# Patient Record
Sex: FEMALE | URBAN - METROPOLITAN AREA
[De-identification: names, ages, dates, MRNs, and addresses within clinical notes are randomized per-mention and may not be internally consistent; named-entity substitution may affect disease eponyms.]

---

## 2021-06-17 PROBLEM — F23 ACUTE PSYCHOSIS (HCC): Status: ACTIVE | Noted: 2021-06-17

## 2021-06-18 ENCOUNTER — HOSPITAL ENCOUNTER (INPATIENT)
Age: 76
LOS: 18 days | Discharge: HOME | DRG: 885 | End: 2021-07-06
Attending: PSYCHIATRY & NEUROLOGY | Admitting: PSYCHIATRY & NEUROLOGY
Payer: MEDICARE

## 2021-06-18 PROBLEM — E43 SEVERE MALNUTRITION (HCC): Chronic | Status: ACTIVE | Noted: 2021-06-18

## 2021-06-18 LAB
ABSOLUTE EOS #: 0.5 K/UL (ref 0–0.4)
ABSOLUTE IMMATURE GRANULOCYTE: ABNORMAL K/UL (ref 0–0.3)
ABSOLUTE LYMPH #: 2.1 K/UL (ref 1–4.8)
ABSOLUTE MONO #: 0.6 K/UL (ref 0.1–1.3)
ALBUMIN SERPL-MCNC: 4.1 G/DL (ref 3.5–5.2)
ALBUMIN/GLOBULIN RATIO: ABNORMAL (ref 1–2.5)
ALP BLD-CCNC: 74 U/L (ref 35–104)
ALT SERPL-CCNC: <5 U/L (ref 5–33)
ANION GAP SERPL CALCULATED.3IONS-SCNC: 12 MMOL/L (ref 9–17)
AST SERPL-CCNC: 14 U/L
BASOPHILS # BLD: 1 % (ref 0–2)
BASOPHILS ABSOLUTE: 0.1 K/UL (ref 0–0.2)
BILIRUB SERPL-MCNC: 0.38 MG/DL (ref 0.3–1.2)
BUN BLDV-MCNC: 13 MG/DL (ref 8–23)
BUN/CREAT BLD: ABNORMAL (ref 9–20)
CALCIUM SERPL-MCNC: 9.4 MG/DL (ref 8.6–10.4)
CHLORIDE BLD-SCNC: 109 MMOL/L (ref 98–107)
CO2: 22 MMOL/L (ref 20–31)
CREAT SERPL-MCNC: 0.94 MG/DL (ref 0.5–0.9)
DIFFERENTIAL TYPE: ABNORMAL
EOSINOPHILS RELATIVE PERCENT: 7 % (ref 0–4)
ESTIMATED AVERAGE GLUCOSE: 114 MG/DL
GFR AFRICAN AMERICAN: >60 ML/MIN
GFR NON-AFRICAN AMERICAN: 58 ML/MIN
GFR SERPL CREATININE-BSD FRML MDRD: ABNORMAL ML/MIN/{1.73_M2}
GFR SERPL CREATININE-BSD FRML MDRD: ABNORMAL ML/MIN/{1.73_M2}
GLUCOSE BLD-MCNC: 125 MG/DL (ref 65–105)
GLUCOSE BLD-MCNC: 94 MG/DL (ref 65–105)
GLUCOSE BLD-MCNC: 95 MG/DL (ref 70–99)
HBA1C MFR BLD: 5.6 % (ref 4–6)
HCT VFR BLD CALC: 36.8 % (ref 36–46)
HEMOGLOBIN: 12.4 G/DL (ref 12–16)
IMMATURE GRANULOCYTES: ABNORMAL %
LYMPHOCYTES # BLD: 33 % (ref 24–44)
MCH RBC QN AUTO: 32.2 PG (ref 26–34)
MCHC RBC AUTO-ENTMCNC: 33.7 G/DL (ref 31–37)
MCV RBC AUTO: 95.6 FL (ref 80–100)
MONOCYTES # BLD: 9 % (ref 1–7)
NRBC AUTOMATED: ABNORMAL PER 100 WBC
PDW BLD-RTO: 13.2 % (ref 11.5–14.9)
PLATELET # BLD: 255 K/UL (ref 150–450)
PLATELET ESTIMATE: ABNORMAL
PMV BLD AUTO: 8.3 FL (ref 6–12)
POTASSIUM SERPL-SCNC: 4.7 MMOL/L (ref 3.7–5.3)
RBC # BLD: 3.85 M/UL (ref 4–5.2)
RBC # BLD: ABNORMAL 10*6/UL
SEG NEUTROPHILS: 50 % (ref 36–66)
SEGMENTED NEUTROPHILS ABSOLUTE COUNT: 3.2 K/UL (ref 1.3–9.1)
SODIUM BLD-SCNC: 143 MMOL/L (ref 135–144)
TOTAL PROTEIN: 6.9 G/DL (ref 6.4–8.3)
TSH SERPL DL<=0.05 MIU/L-ACNC: 4.25 MIU/L (ref 0.3–5)
WBC # BLD: 6.5 K/UL (ref 3.5–11)
WBC # BLD: ABNORMAL 10*3/UL

## 2021-06-18 PROCEDURE — 6370000000 HC RX 637 (ALT 250 FOR IP): Performed by: PSYCHIATRY & NEUROLOGY

## 2021-06-18 PROCEDURE — 82947 ASSAY GLUCOSE BLOOD QUANT: CPT

## 2021-06-18 PROCEDURE — 36415 COLL VENOUS BLD VENIPUNCTURE: CPT

## 2021-06-18 PROCEDURE — 80053 COMPREHEN METABOLIC PANEL: CPT

## 2021-06-18 PROCEDURE — 99223 1ST HOSP IP/OBS HIGH 75: CPT | Performed by: PSYCHIATRY & NEUROLOGY

## 2021-06-18 PROCEDURE — 84443 ASSAY THYROID STIM HORMONE: CPT

## 2021-06-18 PROCEDURE — 99222 1ST HOSP IP/OBS MODERATE 55: CPT | Performed by: INTERNAL MEDICINE

## 2021-06-18 PROCEDURE — 85025 COMPLETE CBC W/AUTO DIFF WBC: CPT

## 2021-06-18 PROCEDURE — APPSS60 APP SPLIT SHARED TIME 46-60 MINUTES

## 2021-06-18 PROCEDURE — 83036 HEMOGLOBIN GLYCOSYLATED A1C: CPT

## 2021-06-18 PROCEDURE — 6370000000 HC RX 637 (ALT 250 FOR IP)

## 2021-06-18 PROCEDURE — 1240000000 HC EMOTIONAL WELLNESS R&B

## 2021-06-18 RX ORDER — DIVALPROEX SODIUM 500 MG/1
500 TABLET, EXTENDED RELEASE ORAL DAILY
Status: ON HOLD | COMMUNITY
End: 2021-07-06 | Stop reason: HOSPADM

## 2021-06-18 RX ORDER — AMLODIPINE BESYLATE 5 MG/1
5 TABLET ORAL DAILY
Status: ON HOLD | COMMUNITY
End: 2021-07-06 | Stop reason: HOSPADM

## 2021-06-18 RX ORDER — PAROXETINE HYDROCHLORIDE 20 MG/1
20 TABLET, FILM COATED ORAL EVERY MORNING
Status: ON HOLD | COMMUNITY
End: 2021-07-06 | Stop reason: HOSPADM

## 2021-06-18 RX ORDER — ACETAMINOPHEN 325 MG/1
650 TABLET ORAL EVERY 4 HOURS PRN
Status: DISCONTINUED | OUTPATIENT
Start: 2021-06-18 | End: 2021-07-06 | Stop reason: HOSPADM

## 2021-06-18 RX ORDER — MAGNESIUM HYDROXIDE/ALUMINUM HYDROXICE/SIMETHICONE 120; 1200; 1200 MG/30ML; MG/30ML; MG/30ML
30 SUSPENSION ORAL EVERY 6 HOURS PRN
Status: DISCONTINUED | OUTPATIENT
Start: 2021-06-18 | End: 2021-07-06 | Stop reason: HOSPADM

## 2021-06-18 RX ORDER — AMLODIPINE BESYLATE 5 MG/1
5 TABLET ORAL DAILY
Status: DISCONTINUED | OUTPATIENT
Start: 2021-06-18 | End: 2021-06-19

## 2021-06-18 RX ORDER — ALLOPURINOL 100 MG/1
100 TABLET ORAL DAILY
Status: DISCONTINUED | OUTPATIENT
Start: 2021-06-18 | End: 2021-07-06 | Stop reason: HOSPADM

## 2021-06-18 RX ORDER — OLANZAPINE 5 MG/1
2.5 TABLET ORAL NIGHTLY
Status: DISCONTINUED | OUTPATIENT
Start: 2021-06-18 | End: 2021-06-27

## 2021-06-18 RX ORDER — OLANZAPINE 2.5 MG/1
2.5 TABLET ORAL NIGHTLY
Status: ON HOLD | COMMUNITY
End: 2021-07-06 | Stop reason: HOSPADM

## 2021-06-18 RX ORDER — ALLOPURINOL 100 MG/1
100 TABLET ORAL DAILY
Status: ON HOLD | COMMUNITY
End: 2021-07-06 | Stop reason: SDUPTHER

## 2021-06-18 RX ORDER — POLYETHYLENE GLYCOL 3350 17 G/17G
17 POWDER, FOR SOLUTION ORAL DAILY PRN
Status: DISCONTINUED | OUTPATIENT
Start: 2021-06-18 | End: 2021-07-06 | Stop reason: HOSPADM

## 2021-06-18 RX ORDER — CHOLECALCIFEROL (VITAMIN D3) 1250 MCG
CAPSULE ORAL
COMMUNITY

## 2021-06-18 RX ORDER — TRAZODONE HYDROCHLORIDE 50 MG/1
50 TABLET ORAL NIGHTLY PRN
Status: DISCONTINUED | OUTPATIENT
Start: 2021-06-18 | End: 2021-07-06 | Stop reason: HOSPADM

## 2021-06-18 RX ORDER — IBUPROFEN 400 MG/1
400 TABLET ORAL EVERY 6 HOURS PRN
Status: DISCONTINUED | OUTPATIENT
Start: 2021-06-18 | End: 2021-07-06 | Stop reason: HOSPADM

## 2021-06-18 RX ORDER — HYDROXYZINE 50 MG/1
50 TABLET, FILM COATED ORAL 3 TIMES DAILY PRN
Status: DISCONTINUED | OUTPATIENT
Start: 2021-06-18 | End: 2021-07-06 | Stop reason: HOSPADM

## 2021-06-18 RX ADMIN — ALLOPURINOL 100 MG: 100 TABLET ORAL at 17:21

## 2021-06-18 RX ADMIN — METFORMIN HYDROCHLORIDE 500 MG: 500 TABLET ORAL at 17:21

## 2021-06-18 RX ADMIN — AMLODIPINE BESYLATE 5 MG: 5 TABLET ORAL at 17:21

## 2021-06-18 RX ADMIN — HYDROXYZINE HYDROCHLORIDE 50 MG: 50 TABLET, FILM COATED ORAL at 04:00

## 2021-06-18 RX ADMIN — Medication 5000 UNITS: at 17:21

## 2021-06-18 ASSESSMENT — SLEEP AND FATIGUE QUESTIONNAIRES
AVERAGE NUMBER OF SLEEP HOURS: 6
DO YOU USE A SLEEP AID: NO
DO YOU HAVE DIFFICULTY SLEEPING: NO

## 2021-06-18 ASSESSMENT — LIFESTYLE VARIABLES: HISTORY_ALCOHOL_USE: NO

## 2021-06-18 ASSESSMENT — PATIENT HEALTH QUESTIONNAIRE - PHQ9: SUM OF ALL RESPONSES TO PHQ QUESTIONS 1-9: 12

## 2021-06-18 NOTE — CONSULTS
[facility]    CONSULTATION / HISTORY AND PHYSICAL EXAMINATION            Date:   6/18/2021  Patient name:  Mata Slaughter  Date of admission:  6/18/2021  2:25 AM  MRN:   [id number]  Account:  [de-identified]  YOB: 1945  PCP:    No primary care provider on file. Room:   [address] [place] [address]  Code Status:    Full Code    Physician Requesting Consult: Murray Garg MD    Reason for Consult:  medical management    Chief Complaint:     No chief complaint on file. Medical management    History Obtained From:     Patient medical record nursing staff    History of Present Illness:     Patient is uncooperative unable to get history of presenting illness mostly noted from nursing staff and medical records history of diabetes and hypertension not on any medication no hypoglycemia noted elderly lady with a false dentures and deconditioning    Past Medical History:     Past Medical History:   Diagnosis Date    Diabetes mellitus ([facility] [address]     Hypertension     Psychiatric problem         Past Surgical History:     History reviewed. No pertinent surgical history. Medications Prior to Admission:     Prior to Admission medications    Medication Sig Start Date End Date Taking?  Authorizing Provider   allopurinol (ZYLOPRIM) 100 MG tablet Take 100 mg by mouth daily    Historical Provider, MD   amLODIPine (NORVASC) 5 MG tablet Take 5 mg by mouth daily    Historical Provider, MD   Cholecalciferol (VITAMIN D3) 1.25 MG (08004 UT) CAPS Take by mouth    Historical Provider, MD   divalproex (DEPAKOTE ER) 500 MG extended release tablet Take 500 mg by mouth daily    Historical Provider, MD   metFORMIN (GLUCOPHAGE) 500 MG tablet Take 500 mg by mouth 2 times daily (with meals)    Historical Provider, MD   OLANZapine (ZYPREXA) 2.5 MG tablet Take 2.5 mg by mouth nightly    Historical Provider, MD   PARoxetine (PAXIL) 20 MG tablet Take 20 mg by mouth every morning    Historical Provider, MD See Mychart message   Allergies:     Patient has no known allergies. Social History:     Tobacco:    reports that she has never smoked. She has never used smokeless tobacco.  Alcohol:      has no history on file for alcohol use. Drug Use:  has no history on file for drug use. Family History:     History reviewed. No pertinent family history. Review of Systems:     Patient is uncooperative unable to get review of systems       Physical Exam:     /86   Pulse 75   Temp 98 °F (36.7 °C) (Oral)   Resp 14   Temp (24hrs), Av °F (36.7 °C), Min:98 °F (36.7 °C), Max:98 °F (36.7 °C)    Recent Labs     21  0239   POCGLU 94     No intake or output data in the 24 hours ending 21 1417    General Appearance:  alert, well appearing, and in no acute distress  Mental status: oriented to person, place, and time with normal affect  Head:  normocephalic, atraumatic. Eye: no icterus, redness, pupils equal and reactive, extraocular eye movements intact, conjunctiva clear  Ear: normal external ear, no discharge, hearing intact  Nose:  no drainage noted  Mouth: mucous membranes moist  Dentures noted in the mouth  Neck: supple, no carotid bruits, thyroid not palpable  Lungs: Bilateral equal air entry, clear to ausculation, no wheezing, rales or rhonchi, normal effort  Cardiovascular: normal rate, regular rhythm, no murmur, gallop, rub.   Abdomen: Soft, nontender, nondistended, normal bowel sounds, no hepatomegaly or splenomegaly  Neurologic: Unable to do neuro exam no focal deficit skin: No gross lesions, rashes, bruising or bleeding on exposed skin area  Extremities:  peripheral pulses palpable, no pedal edema or calf pain with palpation      Investigations:      Laboratory Testing:  Recent Results (from the past 24 hour(s))   POC Glucose Fingerstick    Collection Time: 21  2:39 AM   Result Value Ref Range    POC Glucose 94 65 - 105 mg/dL           Consultations:   IP CONSULT TO INTERNAL MEDICINE  Assessment : Primary Problem  Acute psychosis [facility])    Active Hospital Problems    Diagnosis Date Noted    Severe malnutrition ([facility] [address] [E43] 06/18/2021    [facility] [F23] 06/17/2021       Plan:     Patient is a very poor historian  History noted from the medical charts and nursing staff  History of diabetes mellitus not on any medication we will check hemoglobin A1c preprandial postprandial blood sugar check  Hypertension controlled  Patient needs assistance with feeding and keeping her hydrated  Recommended nursing staff assist with nutrition and hydration  Rule out electrolyte imbalance CBC BMP and LFT TSH ordered    Nemo Garcias MD  6/18/2021  2:17 PM    Copy sent to Dr. Nova Murphy primary care provider on file. Please note that this chart was generated using voice recognition Dragon dictation software. Although every effort was made to ensure the accuracy of this automated transcription, some errors in transcription may have occurred.

## 2021-06-18 NOTE — BH NOTE
LOS NOTE    Pt currently resting in bed. Eyes closed, RR even and unlabored.  LOS continues per order

## 2021-06-18 NOTE — PROGRESS NOTES
Pharmacy Medication History Note      List of current medications patient is taking is unable to assess. Source of information: OARRS and patient    Other notes (ex. Recent course of antibiotics, Coumadin dosing):  Patient is unable to tell what pharmacy she uses to obtain her medications. No information found on [facility] [employer]MENT[employer] or Epic. Please let me know if you have any questions about this encounter. Thank you!     Electronically signed by Fidelina Fontenot [facility] on 6/18/2021 at 8:56 AM

## 2021-06-18 NOTE — PLAN OF CARE
5 [facility]  Initial Interdisciplinary Treatment Plan NOTE      Original treatment plan Date & Time: 6/18/2021               731am  Admission Type:  Admission Type: Involuntary    Reason for admission:   Reason for Admission: SI no plan    Estimated Length of Stay:  5-7days  Estimated Discharge Date: to be determined by physician    PATIENT STRENGTHS:  Patient Strengths:Strengths: Positive Support, No significant Physical Illness  Patient Strengths and Limitations:Limitations: Tendency to isolate self, Lacks leisure interests, Difficulty problem solving/relies on others to help solve problems, Hopeless about future, Multiple barriers to leisure interests, Inappropriate/potentially harmful leisure interests (depression substance abuse anxiety poor coping skills)  Addictive Behavior: Addictive Behavior  In the past 3 months, have you felt or has someone told you that you have a problem with:  : None  Do you have a history of Chemical Use?: No  Do you have a history of Alcohol Use?: No  Do you have a history of Street Drug Abuse?: Yes  Histroy of Prescripton Drug Abuse?: No  Medical Problems:No past medical history on file.   Status EXAM:Status and Exam  Normal: No  Facial Expression: Elevated  Affect: Inappropriate  Level of Consciousness: Alert  Mood:Normal: No  Mood: Depressed, Anxious  Motor Activity:Normal: No  Motor Activity: Decreased  Interview Behavior: Cooperative  Preception: Merom to Person, Veronica Spar to Time, Merom to Place, Merom to Situation  Attention:Normal: Yes  Attention: Distractible  Thought Processes: Circumstantial  Thought Content:Normal: Yes  Thought Content: Preoccupations  Hallucinations: None  Delusions: No  Memory:Normal: Yes  Memory: Poor Recent, Confabulation  Insight and Judgment: No  Insight and Judgment: Unmotivated  Present Suicidal Ideation: No  Present Homicidal Ideation: No    EDUCATION:   Learner Progress Toward Treatment Goals: reviewed group plans and strategies for care    Method:group therapy, medication compliance, individualized assessments and care planning    Outcome: needs reinforcement    PATIENT GOALS: to be discussed with patient within 72 hours    PLAN/TREATMENT RECOMMENDATIONS:     continue group therapy , medications compliance, goal setting, individualized assessments and care, continue to monitor pt on unit      SHORT-TERM GOALS:   Time frame for Short-Term Goals: 5-7 days    LONG-TERM GOALS:  Time frame for Long-Term Goals: 6 months  Members Present in Team Meeting: See Signature Sheet

## 2021-06-18 NOTE — PLAN OF CARE
Nutrition Problem #1: Severe malnutrition  Intervention: Food and/or Nutrient Delivery: Start Oral Diet, Start Oral Nutrition Supplement  Nutritional Goals: Meet greater than 75% of estimated nutrition needs
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
## 2021-06-18 NOTE — PLAN OF CARE
Problem: Falls - Risk of:  Goal: Will remain free from falls  Description: Will remain free from falls  6/18/2021 1244 by Liliana Velasco RN  Outcome: Ongoing  Note: Patient remains free from falls. Staff assisted to bathroom, patient unsteady. Patient did not eat lunch or breakfast, asleep most of shift.

## 2021-06-18 NOTE — GROUP NOTE
Group Therapy Note    Date: 6/18/2021    Group Start Time: 0900  Group End Time: 0915  Group Topic: Community Meeting    LUNA Gutierrez    Patient refused to attend community meeting/goal setting group at 0900 after encouragement from staff. 1:1 talk time provided by staff.          Signature:  Oneil Gutierrez

## 2021-06-18 NOTE — GROUP NOTE
Group Therapy Note    Date: 6/18/2021    Group Start Time: 1330  Group End Time: 0027  Group Topic: Psychoeducation    LUNA Kelsey    Patient refused to attend psycho education group at 1330 after encouragement from staff. 1:1 talk time provided by staff.      Signature:  Сергей Kelsey
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
## 2021-06-18 NOTE — GROUP NOTE
Group Therapy Note    Date: 6/18/2021    Group Start Time: 1100  Group End Time: 1150  Group Topic: Cognitive Skills    LUNA Bolanos, CTRS        Group Therapy Note    Attendees: 12/22         Pt did not participate in Cognitive Skills Group at 1100am when encouraged by RT due to resting in room. Pt was offered talk time as an alternative to group but declined.        Discipline Responsible: Psychoeducational Specialist      Signature:  Maria Luisa Espinoza

## 2021-06-18 NOTE — PROGRESS NOTES
Behavioral Services  Medicare Certification Upon Admission    I certify that this patient's inpatient psychiatric hospital admission is medically necessary for:    [x] (1) Treatment which could reasonably be expected to improve this patient's condition,       [x] (2) Or for diagnostic study;     AND     [x](2) The inpatient psychiatric services are provided while the individual is under the care of a physician and are included in the individualized plan of care.     Estimated length of stay/service 3 to 5 days    Plan for post-hospital care Home versus ECF    Electronically signed by Soco Aldrich MD on 6/18/2021 at 3:01 PM

## 2021-06-18 NOTE — PROGRESS NOTES
Comprehensive Nutrition Assessment    Type and Reason for Visit:  Positive Nutrition Screen (poor appetite poor intake, weight loss)    Nutrition Recommendations/Plan:   1. Continue current diet  2. Start Ensure Enlive 2x daily  3. To note: Patient admitted at 3:00 am, no vitals taken or available. Patient sleeping since admission. Per nursing no sedatives. No running water in house. Not sure about food.  aware and involved. Nutrition Assessment:  Patient admitted with diagnosis of acute psychosis. Per nursing unable to wake patient, didn't eat breakfast or lunch today. Noted in Physician and other providers notes that patient had decreased appetite prior to admission, lost 28 lbs weight but no height and weight recorded in EMR. Patient appears malnourished with fat and muscle loss. Malnutrition Assessment:  Malnutrition Status:  Severe malnutrition    Context:  Acute Illness     Findings of the 6 clinical characteristics of malnutrition:  Energy Intake:  7 - 50% or less of estimated energy requirements for 5 or more days  Weight Loss:  7 - Greater than 5% over 1 month     Body Fat Loss:  7 - Moderate body fat loss Orbital, Triceps   Muscle Mass Loss:  7 - Moderate muscle mass loss Temples (temporalis), Clavicles (pectoralis & deltoids)  Fluid Accumulation:  No significant fluid accumulation     Strength:  Not Performed    Estimated Daily Nutrient Needs:  Energy (kcal): Unable to assess; Weight Used for Energy Requirements:        Protein (g):  Unable to assess; Weight Used for Protein Requirements:             Nutrition Related Findings:  No edema. No other information is available      Current Nutrition Therapies:    ADULT DIET;  Regular    Anthropometric Measures:  · Height:  (unknown by patient)    Nutrition Diagnosis:   · Severe malnutrition related to inadequate protein-energy intake, psychological cause or life stress as evidenced by poor intake prior to admission, intake 0-25%, weight loss, moderate loss of subcutaneous fat, moderate muscle loss      Nutrition Interventions:   Food and/or Nutrient Delivery:  Start Oral Diet, Start Oral Nutrition Supplement  Nutrition Education/Counseling:  Education not indicated   Coordination of Nutrition Care:  Continue to monitor while inpatient    Goals:  Meet greater than 75% of estimated nutrition needs       Nutrition Monitoring and Evaluation:   Behavioral-Environmental Outcomes:   (Demented disorder)   Food/Nutrient Intake Outcomes:  Food and Nutrient Intake, Supplement Intake  Physical Signs/Symptoms Outcomes:  Biochemical Data, Weight     Discharge Planning:     Too soon to determine       Some areas of assessment may be incomplete due to COVID-19 precautions    Annelise Jacques RD, LD  Office phone [phone]
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
## 2021-06-19 LAB
ABSOLUTE EOS #: 0.3 K/UL (ref 0–0.4)
ABSOLUTE IMMATURE GRANULOCYTE: ABNORMAL K/UL (ref 0–0.3)
ABSOLUTE LYMPH #: 1.8 K/UL (ref 1–4.8)
ABSOLUTE MONO #: 0.6 K/UL (ref 0.1–1.3)
BASOPHILS # BLD: 1 % (ref 0–2)
BASOPHILS ABSOLUTE: 0.1 K/UL (ref 0–0.2)
DIFFERENTIAL TYPE: ABNORMAL
EOSINOPHILS RELATIVE PERCENT: 6 % (ref 0–4)
HCT VFR BLD CALC: 31.6 % (ref 36–46)
HEMOGLOBIN: 10.4 G/DL (ref 12–16)
IMMATURE GRANULOCYTES: ABNORMAL %
LYMPHOCYTES # BLD: 31 % (ref 24–44)
MCH RBC QN AUTO: 31.8 PG (ref 26–34)
MCHC RBC AUTO-ENTMCNC: 32.9 G/DL (ref 31–37)
MCV RBC AUTO: 96.7 FL (ref 80–100)
MONOCYTES # BLD: 10 % (ref 1–7)
NRBC AUTOMATED: ABNORMAL PER 100 WBC
PDW BLD-RTO: 13.7 % (ref 11.5–14.9)
PLATELET # BLD: 227 K/UL (ref 150–450)
PLATELET ESTIMATE: ABNORMAL
PMV BLD AUTO: 7.8 FL (ref 6–12)
RBC # BLD: 3.27 M/UL (ref 4–5.2)
RBC # BLD: ABNORMAL 10*6/UL
SEG NEUTROPHILS: 52 % (ref 36–66)
SEGMENTED NEUTROPHILS ABSOLUTE COUNT: 3 K/UL (ref 1.3–9.1)
VITAMIN D 25-HYDROXY: 31 NG/ML (ref 30–100)
WBC # BLD: 5.9 K/UL (ref 3.5–11)
WBC # BLD: ABNORMAL 10*3/UL

## 2021-06-19 PROCEDURE — 6370000000 HC RX 637 (ALT 250 FOR IP)

## 2021-06-19 PROCEDURE — 36415 COLL VENOUS BLD VENIPUNCTURE: CPT

## 2021-06-19 PROCEDURE — 6370000000 HC RX 637 (ALT 250 FOR IP): Performed by: PSYCHIATRY & NEUROLOGY

## 2021-06-19 PROCEDURE — 99232 SBSQ HOSP IP/OBS MODERATE 35: CPT | Performed by: INTERNAL MEDICINE

## 2021-06-19 PROCEDURE — 1240000000 HC EMOTIONAL WELLNESS R&B

## 2021-06-19 PROCEDURE — 85025 COMPLETE CBC W/AUTO DIFF WBC: CPT

## 2021-06-19 PROCEDURE — 99231 SBSQ HOSP IP/OBS SF/LOW 25: CPT | Performed by: NURSE PRACTITIONER

## 2021-06-19 PROCEDURE — 82306 VITAMIN D 25 HYDROXY: CPT

## 2021-06-19 RX ADMIN — METFORMIN HYDROCHLORIDE 500 MG: 500 TABLET ORAL at 17:08

## 2021-06-19 RX ADMIN — Medication 5000 UNITS: at 08:40

## 2021-06-19 RX ADMIN — IBUPROFEN 400 MG: 400 TABLET, FILM COATED ORAL at 22:33

## 2021-06-19 RX ADMIN — TRAZODONE HYDROCHLORIDE 50 MG: 50 TABLET ORAL at 20:55

## 2021-06-19 RX ADMIN — OLANZAPINE 2.5 MG: 5 TABLET, FILM COATED ORAL at 20:51

## 2021-06-19 RX ADMIN — ALLOPURINOL 100 MG: 100 TABLET ORAL at 08:40

## 2021-06-19 RX ADMIN — HYDROXYZINE HYDROCHLORIDE 50 MG: 50 TABLET, FILM COATED ORAL at 20:52

## 2021-06-19 RX ADMIN — METFORMIN HYDROCHLORIDE 500 MG: 500 TABLET ORAL at 08:40

## 2021-06-19 ASSESSMENT — PAIN DESCRIPTION - DESCRIPTORS: DESCRIPTORS: ACHING

## 2021-06-19 ASSESSMENT — PAIN SCALES - GENERAL: PAINLEVEL_OUTOF10: 5

## 2021-06-19 ASSESSMENT — PAIN - FUNCTIONAL ASSESSMENT: PAIN_FUNCTIONAL_ASSESSMENT: 0-10

## 2021-06-19 NOTE — PLAN OF CARE
(SAMIRA, pt sleeping all of shift)  Delusions:  (SAMIRA, pt sleeping all of shift)  Memory:Normal:  (SAMIRA, pt sleeping all of shift)  Memory:  (SAMIRA, pt sleeping all of shift)  Insight and Judgment:  (SAMIRA, pt sleeping all of shift)  Insight and Judgment:  (SAMIRA, pt sleeping all of shift)  Present Suicidal Ideation:  (SAMIRA, pt sleeping all of shift)  Present Homicidal Ideation:  (SAMIRA, pt sleeping all of shift)    Daily Assessment Last Entry:   Daily Sleep (WDL): Exceptions to WDL         Patient Currently in Pain: Denies  Daily Nutrition (WDL): Exceptions to WDL (patient did not wake up for breakfast or lunch.)  Barriers to Nutrition: Elderly patient  Level of Assistance: Partial assist (patient cannot sit up alone for long.)    Patient Monitoring:  Frequency of Checks: 4 times per hour, close    Psychiatric Symptoms:   Depression Symptoms  Depression Symptoms: Isolative, Change in energy level  Anxiety Symptoms  Anxiety Symptoms:  (SAMIRA, pt sleeping all of shift)  Judith Symptoms  Jduith Symptoms:  (SAMIRA, pt sleeping all of shift)     Psychosis Symptoms  Delusion Type:  (SAMIRA, pt sleeping all of shift)    Suicide Risk CSSR-S:  1) Within the past month, have you wished you were dead or wished you could go to sleep and not wake up? : No  2) Have you actually had any thoughts of killing yourself? : No  6) Have you ever done anything, started to do anything, or prepared to do anything to end your life?: No  Change in Result:  Change in Plan of care:      EDUCATION:   Learner Progress Toward Treatment Goals: Reviewed results and recommendations of this team, Reviewed group plan and strategies, Reviewed signs, symptoms and risk of self harm and violent behavior, Reviewed goals and plan of care    Method:  small group, individual verbal education    Outcome: Verbalized by patient but needs reinforcement to obtain goals    PATIENT GOALS:  Short term:  Pt declined to participate  Long term:  Pt declined to participate    PLAN/TREATMENT RECOMMENDATIONS UPDATE:  continue with group therapies, increased socialization, continue planning for after discharge goals, continue with medication compliance    SHORT-TERM GOALS UPDATE:   Time frame for Short-Term Goals:  5-7 days    LONG-TERM GOALS UPDATE:   Time frame for Long-Term Goals:  6 months    Members Present in Team Meeting:   See signature sheet  Daylin Reina

## 2021-06-19 NOTE — BH NOTE
LOS    Patient continues to rest in bed with her eyes closed at this time. Respirations are even and unlabored. Writer observes patient chest actively rise and fall with each breath. No distress noted. LOS maintained for patient safety per order.

## 2021-06-19 NOTE — PLAN OF CARE
Problem: Altered Mood, Deterioration in Function:  Goal: Able to verbalize reality based thinking  Description: Able to verbalize reality based thinking  Outcome: Ongoing  Note: Patient asleep all shift. Writer unable to assess at this time. Problem: Falls - Risk of:  Goal: Will remain free from falls  Description: Will remain free from falls  6/18/2021 2343 by Praveen Kirk RN  Outcome: Ongoing  Note: Fall precautions remain in place. Patient close to the nurses station and door remains open. Bed is locked and in the lowest position. Bed alarm is on.

## 2021-06-19 NOTE — PROGRESS NOTES
LOS    Patient continues to rest in bed with eyes closed. Respirations are even and unlabored. Patient continues to have active chest rise and falls with each breath. Patient awakened briefly and denied needing to use the bathroom. LOS maintained for patient safety per order.
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
## 2021-06-19 NOTE — BH NOTE
Patient is asleep in bed with her eyes closed. Patient's room door is open, and her bed is in the lowest position. Will continue to monitor.

## 2021-06-19 NOTE — BH NOTE
LOS NOTE:  Patient resting in bed, no signs of distress, makes staff aware of needs. Will continue to monitor.

## 2021-06-19 NOTE — BH NOTE
Writer into patient's room for assessment. Pt sleeping when writer entered. Writer attempted to wake pt. Pt kept sleeping. Counted RR and they were even and unlabored. Will continue to monitor pt.

## 2021-06-19 NOTE — PLAN OF CARE
Problem: Falls - Risk of:  Goal: Will remain free from falls  Description: Will remain free from falls  6/19/2021 1619 by Renee Amezcua  Outcome: Ongoing  Note: Pt remains free of falls and verbalizes understanding of individual fall risks. Pt wearing non skid footwear and encouraged to seek out staff for any assistance needed. Patient calls out for needs and uses wheelchair to get around. Problem: Altered Mood, Psychotic Behavior:  Goal: Able to verbalize decrease in frequency and intensity of hallucinations  Description: Able to verbalize decrease in frequency and intensity of hallucinations  6/19/2021 1619 by Renee Amezcua  Outcome: Ongoing  Note: Patient is accepting of 1:1 talk time with staff. Patient denies suicidal and homicidal ideation and auditory and visual hallucinations and verbally agrees to approach staff if thoughts of self harm arises. Patient is eating and sleeping well. Patient is focused on wanting to be discharged. Patient lets staff know appropriately when she needs help getting up. Patient is up and out of room, in wheelchair. Patient does not attend unit programming and is aloof of peers. Patient is medication compliant. Q15 minute checks maintained. Patient remains safe at this time.

## 2021-06-19 NOTE — GROUP NOTE
Group Therapy Note    Date: 6/19/2021    Group Start Time: 0900  Group End Time: 0935  Group Topic: Community Meeting    [facility]    Patient refused to attend community meeting and goal setting group at 0900 after encouragement from staff. 1:1 talk time offered by staff as alternative to group session.

## 2021-06-19 NOTE — BH NOTE
LOS NOTE:  Patient resting in room, no signs of distress, makes staff aware of needs. Will continue to monitor.

## 2021-06-19 NOTE — PROGRESS NOTES
[facility]    CONSULTATION / HISTORY AND PHYSICAL EXAMINATION            Date:   6/19/2021  Patient name:  Flaquita Shultz  Date of admission:  6/18/2021  2:25 AM  MRN:   [id number]  Account:  [de-identified]  YOB: 1945  PCP:    No primary care provider on file. Room:   [address] [place] [address]  Code Status:    Full Code    Physician Requesting Consult: Rosa Estrella MD    Reason for Consult:  medical management    Chief Complaint:     No chief complaint on file. Medical management    History Obtained From:     Patient medical record nursing staff    History of Present Illness:     Patient is uncooperative unable to get history of presenting illness mostly noted from nursing staff and medical records history of diabetes and hypertension not on any medication no hypoglycemia noted elderly lady with a false dentures and deconditioning    Past Medical History:     Past Medical History:   Diagnosis Date    Diabetes mellitus ([facility] [address]     Hypertension     Psychiatric problem         Past Surgical History:     History reviewed. No pertinent surgical history. Medications Prior to Admission:     Prior to Admission medications    Medication Sig Start Date End Date Taking?  Authorizing Provider   allopurinol (ZYLOPRIM) 100 MG tablet Take 100 mg by mouth daily    Historical Provider, MD   amLODIPine (NORVASC) 5 MG tablet Take 5 mg by mouth daily    Historical Provider, MD   Cholecalciferol (VITAMIN D3) 1.25 MG (57688 UT) CAPS Take by mouth    Historical Provider, MD   divalproex (DEPAKOTE ER) 500 MG extended release tablet Take 500 mg by mouth daily    Historical Provider, MD   metFORMIN (GLUCOPHAGE) 500 MG tablet Take 500 mg by mouth 2 times daily (with meals)    Historical Provider, MD   OLANZapine (ZYPREXA) 2.5 MG tablet Take 2.5 mg by mouth nightly    Historical Provider, MD   PARoxetine (PAXIL) 20 MG tablet Take 20 mg by mouth every morning    Historical Provider, MD Allergies:     Patient has no known allergies. Social History:     Tobacco:    reports that she has never smoked. She has never used smokeless tobacco.  Alcohol:      has no history on file for alcohol use. Drug Use:  has no history on file for drug use. Family History:     History reviewed. No pertinent family history. Review of Systems:     Patient is uncooperative unable to get review of systems       Physical Exam:     BP (!) 98/50   Pulse 79   Temp 98 °F (36.7 °C) (Oral)   Resp 14   Temp (24hrs), Av °F (36.7 °C), Min:98 °F (36.7 °C), Max:98 °F (36.7 °C)    Recent Labs     21  0239 21  1720   POCGLU 94 125*     No intake or output data in the 24 hours ending 21 1823    General Appearance:  alert, well appearing, and in no acute distress  Mental status: oriented to person, place, and time with normal affect  Head:  normocephalic, atraumatic. Eye: no icterus, redness, pupils equal and reactive, extraocular eye movements intact, conjunctiva clear  Ear: normal external ear, no discharge, hearing intact  Nose:  no drainage noted  Mouth: mucous membranes moist  Dentures noted in the mouth  Neck: supple, no carotid bruits, thyroid not palpable  Lungs: Bilateral equal air entry, clear to ausculation, no wheezing, rales or rhonchi, normal effort  Cardiovascular: normal rate, regular rhythm, no murmur, gallop, rub.   Abdomen: Soft, nontender, nondistended, normal bowel sounds, no hepatomegaly or splenomegaly  Neurologic: Unable to do neuro exam no focal deficit skin: No gross lesions, rashes, bruising or bleeding on exposed skin area  Extremities:  peripheral pulses palpable, no pedal edema or calf pain with palpation      Investigations:      Laboratory Testing:  Recent Results (from the past 24 hour(s))   CBC Auto Differential    Collection Time: 21  6:47 AM   Result Value Ref Range    WBC 5.9 3.5 - 11.0 k/uL    RBC 3.27 (L) 4.0 - 5.2 m/uL    Hemoglobin 10.4 (L) 12.0 - 16.0 g/dL    Hematocrit 31.6 (L) 36 - 46 %    MCV 96.7 80 - 100 fL    MCH 31.8 26 - 34 pg    MCHC 32.9 31 - 37 g/dL    RDW 13.7 11.5 - 14.9 %    Platelets 455 760 - 344 k/uL    MPV 7.8 6.0 - 12.0 fL    NRBC Automated NOT REPORTED per 100 WBC    Differential Type NOT REPORTED     Seg Neutrophils 52 36 - 66 %    Lymphocytes 31 24 - 44 %    Monocytes 10 (H) 1 - 7 %    Eosinophils % 6 (H) 0 - 4 %    Basophils 1 0 - 2 %    Immature Granulocytes NOT REPORTED 0 %    Segs Absolute 3.00 1.3 - 9.1 k/uL    Absolute Lymph # 1.80 1.0 - 4.8 k/uL    Absolute Mono # 0.60 0.1 - 1.3 k/uL    Absolute Eos # 0.30 0.0 - 0.4 k/uL    Basophils Absolute 0.10 0.0 - 0.2 k/uL    Absolute Immature Granulocyte NOT REPORTED 0.00 - 0.30 k/uL    WBC Morphology NOT REPORTED     RBC Morphology NOT REPORTED     Platelet Estimate NOT REPORTED    VITAMIN D 25 HYDROXY    Collection Time: 06/19/21  6:47 AM   Result Value Ref Range    Vit D, 25-Hydroxy 31.0 30.0 - 100.0 ng/mL           Consultations:   IP CONSULT TO INTERNAL MEDICINE  IP CONSULT TO INTERNAL MEDICINE  Assessment :      Primary Problem  Acute psychosis [facility])    Active Hospital Problems    Diagnosis Date Noted    Severe malnutrition ([facility] [address] [E43] 06/18/2021    Acute psychosis ([facility] [address] [F23] 06/17/2021       Plan:     Patient is a very poor historian  History noted from the medical charts and nursing staff  History of diabetes mellitus not on any medication we will check hemoglobin A1c preprandial postprandial blood sugar check  Hypertension controlled  Patient needs assistance with feeding and keeping her hydrated  Recommended nursing staff assist with nutrition and hydration  Rule out electrolyte imbalance CBC BMP and LFT TSH ordered    6/19   Patient very poor historian  Blood sugar, blood pressure is low  We will discontinue Metformin, Norvasc  Has very poor oral intake    Darian Zendejas MD  6/19/2021  6:23 PM    Copy sent to Dr. Quang Delacruz primary care provider on file.     Please note that this chart was generated using voice recognition Dragon dictation software. Although every effort was made to ensure the accuracy of this automated transcription, some errors in transcription may have occurred.

## 2021-06-19 NOTE — GROUP NOTE
Group Therapy Note    Date: 6/19/2021    Group Start Time: 1330  Group End Time: 7016  Group Topic: Psychoeducation    [facility] Caryle Calix, RACHAELS    Patient refused to attend music for coping group at 1330 after encouragement from staff. 1:1 talk time offered by staff as alternative to group session.

## 2021-06-19 NOTE — BH NOTE
LOS NOTE    Pt currently resting in bed with eyes closed. RR even and unlabored. LOS continues for pt safety.

## 2021-06-20 LAB
ABSOLUTE EOS #: 0.4 K/UL (ref 0–0.4)
ABSOLUTE IMMATURE GRANULOCYTE: ABNORMAL K/UL (ref 0–0.3)
ABSOLUTE LYMPH #: 2 K/UL (ref 1–4.8)
ABSOLUTE MONO #: 0.6 K/UL (ref 0.1–1.3)
BASOPHILS # BLD: 0 % (ref 0–2)
BASOPHILS ABSOLUTE: 0 K/UL (ref 0–0.2)
DIFFERENTIAL TYPE: ABNORMAL
EOSINOPHILS RELATIVE PERCENT: 7 % (ref 0–4)
HCT VFR BLD CALC: 28.8 % (ref 36–46)
HEMOGLOBIN: 9.5 G/DL (ref 12–16)
IMMATURE GRANULOCYTES: ABNORMAL %
LYMPHOCYTES # BLD: 36 % (ref 24–44)
MCH RBC QN AUTO: 31.1 PG (ref 26–34)
MCHC RBC AUTO-ENTMCNC: 32.8 G/DL (ref 31–37)
MCV RBC AUTO: 94.8 FL (ref 80–100)
MONOCYTES # BLD: 11 % (ref 1–7)
NRBC AUTOMATED: ABNORMAL PER 100 WBC
PDW BLD-RTO: 13.4 % (ref 11.5–14.9)
PLATELET # BLD: 205 K/UL (ref 150–450)
PLATELET ESTIMATE: ABNORMAL
PMV BLD AUTO: 8.4 FL (ref 6–12)
RBC # BLD: 3.04 M/UL (ref 4–5.2)
RBC # BLD: ABNORMAL 10*6/UL
SEG NEUTROPHILS: 46 % (ref 36–66)
SEGMENTED NEUTROPHILS ABSOLUTE COUNT: 2.5 K/UL (ref 1.3–9.1)
WBC # BLD: 5.5 K/UL (ref 3.5–11)
WBC # BLD: ABNORMAL 10*3/UL

## 2021-06-20 PROCEDURE — 85025 COMPLETE CBC W/AUTO DIFF WBC: CPT

## 2021-06-20 PROCEDURE — 6370000000 HC RX 637 (ALT 250 FOR IP): Performed by: INTERNAL MEDICINE

## 2021-06-20 PROCEDURE — 99232 SBSQ HOSP IP/OBS MODERATE 35: CPT | Performed by: INTERNAL MEDICINE

## 2021-06-20 PROCEDURE — 1240000000 HC EMOTIONAL WELLNESS R&B

## 2021-06-20 PROCEDURE — 6370000000 HC RX 637 (ALT 250 FOR IP): Performed by: PSYCHIATRY & NEUROLOGY

## 2021-06-20 PROCEDURE — 99232 SBSQ HOSP IP/OBS MODERATE 35: CPT | Performed by: PSYCHIATRY & NEUROLOGY

## 2021-06-20 PROCEDURE — 36415 COLL VENOUS BLD VENIPUNCTURE: CPT

## 2021-06-20 PROCEDURE — 6370000000 HC RX 637 (ALT 250 FOR IP)

## 2021-06-20 RX ORDER — CIPROFLOXACIN HYDROCHLORIDE 3.5 MG/ML
2 SOLUTION/ DROPS TOPICAL
Status: DISCONTINUED | OUTPATIENT
Start: 2021-06-20 | End: 2021-07-05

## 2021-06-20 RX ADMIN — HYDROXYZINE HYDROCHLORIDE 50 MG: 50 TABLET, FILM COATED ORAL at 20:30

## 2021-06-20 RX ADMIN — CIPROFLOXACIN 2 DROP: 3 SOLUTION OPHTHALMIC at 22:36

## 2021-06-20 RX ADMIN — TRAZODONE HYDROCHLORIDE 50 MG: 50 TABLET ORAL at 20:30

## 2021-06-20 RX ADMIN — IBUPROFEN 400 MG: 400 TABLET, FILM COATED ORAL at 20:26

## 2021-06-20 RX ADMIN — OLANZAPINE 2.5 MG: 5 TABLET, FILM COATED ORAL at 20:30

## 2021-06-20 RX ADMIN — Medication 5000 UNITS: at 08:44

## 2021-06-20 RX ADMIN — ALLOPURINOL 100 MG: 100 TABLET ORAL at 08:44

## 2021-06-20 ASSESSMENT — PAIN SCALES - GENERAL: PAINLEVEL_OUTOF10: 5

## 2021-06-20 NOTE — PROGRESS NOTES
[facility]    CONSULTATION / HISTORY AND PHYSICAL EXAMINATION            Date:   6/20/2021  Patient name:  Janel Coates  Date of admission:  6/18/2021  2:25 AM  MRN:   [id number]  Account:  [de-identified]  YOB: 1945  PCP:    No primary care provider on file. Room:   [address] [place] [address]  Code Status:    Full Code    Physician Requesting Consult: Simon Muñiz MD    Reason for Consult:  medical management    Chief Complaint:     No chief complaint on file. Medical management    History Obtained From:     Patient medical record nursing staff    History of Present Illness:     Patient is uncooperative unable to get history of presenting illness mostly noted from nursing staff and medical records history of diabetes and hypertension not on any medication no hypoglycemia noted elderly lady with a false dentures and deconditioning  6/20   Patient is having drainage from right eye associated with crusting  Noticed vision problem of right eye    Past Medical History:     Past Medical History:   Diagnosis Date    Diabetes mellitus ([address]     Hypertension     Psychiatric problem         Past Surgical History:     History reviewed. No pertinent surgical history. Medications Prior to Admission:     Prior to Admission medications    Medication Sig Start Date End Date Taking?  Authorizing Provider   allopurinol (ZYLOPRIM) 100 MG tablet Take 100 mg by mouth daily    Historical Provider, MD   amLODIPine (NORVASC) 5 MG tablet Take 5 mg by mouth daily    Historical Provider, MD   Cholecalciferol (VITAMIN D3) 1.25 MG (93224 UT) CAPS Take by mouth    Historical Provider, MD   divalproex (DEPAKOTE ER) 500 MG extended release tablet Take 500 mg by mouth daily    Historical Provider, MD   metFORMIN (GLUCOPHAGE) 500 MG tablet Take 500 mg by mouth 2 times daily (with meals)    Historical Provider, MD   OLANZapine (ZYPREXA) 2.5 MG tablet Take 2.5 mg by mouth nightly Historical Provider, MD   PARoxetine (PAXIL) 20 MG tablet Take 20 mg by mouth every morning    Historical Provider, MD        Allergies:     Patient has no known allergies. Social History:     Tobacco:    reports that she has never smoked. She has never used smokeless tobacco.  Alcohol:      has no history on file for alcohol use. Drug Use:  has no history on file for drug use. Family History:     History reviewed. No pertinent family history. Review of Systems:     Patient is uncooperative unable to get review of systems       Physical Exam:     BP (!) 91/54   Pulse 62   Temp 98.7 °F (37.1 °C) (Oral)   Resp 14   Temp (24hrs), Av.9 °F (36.6 °C), Min:97.1 °F (36.2 °C), Max:98.7 °F (37.1 °C)    Recent Labs     21  0239 21  1720   POCGLU 94 125*     No intake or output data in the 24 hours ending 21 1624    General Appearance:  alert, well appearing, and in no acute distress  Mental status: oriented to person, place, and time with normal affect  Head:  normocephalic, atraumatic. Eye: no icterus, redness, pupils equal and reactive, extraocular eye movements intact, conjunctiva clear  Ear: normal external ear, no discharge, hearing intact  Nose:  no drainage noted  Mouth: mucous membranes moist  Dentures noted in the mouth  Neck: supple, no carotid bruits, thyroid not palpable  Lungs: Bilateral equal air entry, clear to ausculation, no wheezing, rales or rhonchi, normal effort  Cardiovascular: normal rate, regular rhythm, no murmur, gallop, rub.   Abdomen: Soft, nontender, nondistended, normal bowel sounds, no hepatomegaly or splenomegaly  Neurologic: Unable to do neuro exam no focal deficit skin: No gross lesions, rashes, bruising or bleeding on exposed skin area  Extremities:  peripheral pulses palpable, no pedal edema or calf pain with palpation      Investigations:      Laboratory Testing:  Recent Results (from the past 24 hour(s))   CBC Auto Differential    Collection Time: 06/20/21  7:17 AM   Result Value Ref Range    WBC 5.5 3.5 - 11.0 k/uL    RBC 3.04 (L) 4.0 - 5.2 m/uL    Hemoglobin 9.5 (L) 12.0 - 16.0 g/dL    Hematocrit 28.8 (L) 36 - 46 %    MCV 94.8 80 - 100 fL    MCH 31.1 26 - 34 pg    MCHC 32.8 31 - 37 g/dL    RDW 13.4 11.5 - 14.9 %    Platelets 436 208 - 900 k/uL    MPV 8.4 6.0 - 12.0 fL    NRBC Automated NOT REPORTED per 100 WBC    Differential Type NOT REPORTED     Seg Neutrophils 46 36 - 66 %    Lymphocytes 36 24 - 44 %    Monocytes 11 (H) 1 - 7 %    Eosinophils % 7 (H) 0 - 4 %    Basophils 0 0 - 2 %    Immature Granulocytes NOT REPORTED 0 %    Segs Absolute 2.50 1.3 - 9.1 k/uL    Absolute Lymph # 2.00 1.0 - 4.8 k/uL    Absolute Mono # 0.60 0.1 - 1.3 k/uL    Absolute Eos # 0.40 0.0 - 0.4 k/uL    Basophils Absolute 0.00 0.0 - 0.2 k/uL    Absolute Immature Granulocyte NOT REPORTED 0.00 - 0.30 k/uL    WBC Morphology NOT REPORTED     RBC Morphology NOT REPORTED     Platelet Estimate NOT REPORTED            Consultations:   IP CONSULT TO INTERNAL MEDICINE  IP CONSULT TO INTERNAL MEDICINE  Assessment :      Primary Problem  Acute psychosis ([facility]    Active Hospital Problems    Diagnosis Date Noted    Severe malnutrition ([facility] [E43] 06/18/2021    Acute psychosis ([facility] [F23] 06/17/2021       Plan:     Patient is a very poor historian  History noted from the medical charts and nursing staff  History of diabetes mellitus not on any medication we will check hemoglobin A1c preprandial postprandial blood sugar check  Hypertension controlled  Patient needs assistance with feeding and keeping her hydrated  Recommended nursing staff assist with nutrition and hydration  Rule out electrolyte imbalance CBC BMP and LFT TSH ordered    6/19   Patient very poor historian  Blood sugar, blood pressure is low  We will discontinue Metformin, Norvasc  Has very poor oral intake  6/20   Ordering ciprofloxacin eyedrops  Patient follows with ophthalmology as outpatient for possible cataract surgery  Devyn Dominguez MD  6/20/2021  4:24 PM    Copy sent to Dr. Rodriguez primary care provider on file. Please note that this chart was generated using voice recognition Dragon dictation software. Although every effort was made to ensure the accuracy of this automated transcription, some errors in transcription may have occurred.

## 2021-06-20 NOTE — BH NOTE
LOS NOTE    Patient currently resting in bed with eyes closed. RR even and unlabored. No distress noted. LOS continues for patient safety.

## 2021-06-20 NOTE — BH NOTE
LOS NOTE    Patient is resting in room with even and non-labored respirations. Line of sight maintained per physician order for patient safety.

## 2021-06-20 NOTE — PLAN OF CARE
Problem: Falls - Risk of:  Goal: Will remain free from falls  Description: Will remain free from falls  Outcome: Ongoing  Note: Pt remains free of falls and verbalizes understanding of individual fall risks. Pt wearing non skid footwear and encouraged to seek out staff for any assistance needed. Patient is x1 assist. Patient uses wheelchair. Problem: Altered Mood, Deterioration in Function:  Goal: Able to verbalize reality based thinking  Description: Able to verbalize reality based thinking  Outcome: Ongoing  Note: Patient is oriented to herself and time only. Patient is focused on wanting to be discharged. Patient denies suicidal and homicidal ideation and auditory and visual hallucinations and verbally agrees to approach staff if thoughts of self harm arises. Patients appetite has increased. Patient has been sleeping well. Patient is out of room more but aloof of peers. Reassurance and support provided. Q15 minute checks maintained. Patient remains safe at this time.

## 2021-06-20 NOTE — BH NOTE
LOS NOTE    Pt currently awake in her room resting in bed. No distress noted. LOS continues for patient safety.

## 2021-06-20 NOTE — BH NOTE
LOS NOTE:     Patient is resting in room with even and non labored respirations. Line of sight maintained per physician order for patient safety.

## 2021-06-20 NOTE — PLAN OF CARE
Problem: Altered Mood, Deterioration in Function:  Goal: Able to verbalize reality based thinking  Description: Able to verbalize reality based thinking  6/19/2021 2259 by Vijaya Pack RN  Outcome: Ongoing  Note: Patient very repetitive when speaking with writer. Patient is very focused on her medications and getting home. Patient states she takes 14 medications a day and she is worried she is not getting any medications here. Patient states she needs medications for her depression, bipolar, and diabetes. Writer explained to patient that she is getting medications here and told writer what medications they are. Patient forgetful of conversations. Patient very worried about being in the hospital. She states she needs to get home to take care of the house but then states the house is not safe, has no clean water, and the dryer does not work. Patent very apologetic when talking with writer. Writer reassured patient that she is safe here. Problem: Falls - Risk of:  Goal: Will remain free from falls  Description: Will remain free from falls  6/19/2021 2259 by Vijaya Pack RN  Outcome: Ongoing  Note: Pt remains free of falls and verbalizes understanding of individual fall risks. Pt wearing non skid footwear and encouraged to seek out staff for any assistance needed. Pt ambulated with staff assist and has been using wheelchair when out in day area.

## 2021-06-20 NOTE — GROUP NOTE
Group Therapy Note    Date: 6/20/2021    Group Start Time: 0900  Group End Time: 2203  Group Topic: Community Meeting    STJORDAN Wilkinson, CTRS    Pt did not attend 0900 goal setting/ community meeting group d/t resting in room despite staff invitation to attend. 1:1 talk time offered as alternative to group session, pt declined.               Signature:  Anitra Rubalcava

## 2021-06-20 NOTE — BH NOTE
LOS NOTE:    Patient sitting in wheelchair in dayroom eating lunch. Line of sight maintained per physician order for patient safety.

## 2021-06-20 NOTE — GROUP NOTE
Group Therapy Note    Date: 6/20/2021    Group Start Time: 1330  Group End Time: 2612  Group Topic: Cognitive Skills    [facility] FLO Castro, APOLINAR    Pt did not attend 1330 coping/ cognitive skills group d/t resting in room despite staff invitation to attend. 1:1 talk time offered as alternative to group session, pt declined.             Signature:  Tomer oSto

## 2021-06-20 NOTE — BH NOTE
LOS NOTE    Patient is in the milieu and remains in behavioral control. Line of sight maintained per physician order for patient safety.

## 2021-06-20 NOTE — BH NOTE
LOS NOTE    Patient out in day room sitting by windows with peers. No distress noted. LOS continues for patient safety.
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
## 2021-06-21 LAB
ABSOLUTE EOS #: 0.4 K/UL (ref 0–0.4)
ABSOLUTE IMMATURE GRANULOCYTE: ABNORMAL K/UL (ref 0–0.3)
ABSOLUTE LYMPH #: 2.2 K/UL (ref 1–4.8)
ABSOLUTE MONO #: 0.6 K/UL (ref 0.1–1.3)
BASOPHILS # BLD: 1 % (ref 0–2)
BASOPHILS ABSOLUTE: 0 K/UL (ref 0–0.2)
DIFFERENTIAL TYPE: ABNORMAL
EOSINOPHILS RELATIVE PERCENT: 8 % (ref 0–4)
HCT VFR BLD CALC: 28.8 % (ref 36–46)
HEMOGLOBIN: 9.3 G/DL (ref 12–16)
IMMATURE GRANULOCYTES: ABNORMAL %
LYMPHOCYTES # BLD: 38 % (ref 24–44)
MCH RBC QN AUTO: 31.2 PG (ref 26–34)
MCHC RBC AUTO-ENTMCNC: 32.5 G/DL (ref 31–37)
MCV RBC AUTO: 95.9 FL (ref 80–100)
MONOCYTES # BLD: 11 % (ref 1–7)
NRBC AUTOMATED: ABNORMAL PER 100 WBC
PDW BLD-RTO: 13.7 % (ref 11.5–14.9)
PLATELET # BLD: 198 K/UL (ref 150–450)
PLATELET ESTIMATE: ABNORMAL
PMV BLD AUTO: 8.1 FL (ref 6–12)
RBC # BLD: 3 M/UL (ref 4–5.2)
RBC # BLD: ABNORMAL 10*6/UL
SEG NEUTROPHILS: 42 % (ref 36–66)
SEGMENTED NEUTROPHILS ABSOLUTE COUNT: 2.4 K/UL (ref 1.3–9.1)
WBC # BLD: 5.7 K/UL (ref 3.5–11)
WBC # BLD: ABNORMAL 10*3/UL

## 2021-06-21 PROCEDURE — 97116 GAIT TRAINING THERAPY: CPT

## 2021-06-21 PROCEDURE — APPSS15 APP SPLIT SHARED TIME 0-15 MINUTES: Performed by: NURSE PRACTITIONER

## 2021-06-21 PROCEDURE — 97162 PT EVAL MOD COMPLEX 30 MIN: CPT

## 2021-06-21 PROCEDURE — 1240000000 HC EMOTIONAL WELLNESS R&B

## 2021-06-21 PROCEDURE — 36415 COLL VENOUS BLD VENIPUNCTURE: CPT

## 2021-06-21 PROCEDURE — 6370000000 HC RX 637 (ALT 250 FOR IP)

## 2021-06-21 PROCEDURE — 6370000000 HC RX 637 (ALT 250 FOR IP): Performed by: PSYCHIATRY & NEUROLOGY

## 2021-06-21 PROCEDURE — 85025 COMPLETE CBC W/AUTO DIFF WBC: CPT

## 2021-06-21 PROCEDURE — 99232 SBSQ HOSP IP/OBS MODERATE 35: CPT | Performed by: PSYCHIATRY & NEUROLOGY

## 2021-06-21 RX ADMIN — OLANZAPINE 2.5 MG: 5 TABLET, FILM COATED ORAL at 20:49

## 2021-06-21 RX ADMIN — HYDROXYZINE HYDROCHLORIDE 50 MG: 50 TABLET, FILM COATED ORAL at 20:49

## 2021-06-21 RX ADMIN — TRAZODONE HYDROCHLORIDE 50 MG: 50 TABLET ORAL at 20:49

## 2021-06-21 RX ADMIN — IBUPROFEN 400 MG: 400 TABLET, FILM COATED ORAL at 20:49

## 2021-06-21 RX ADMIN — ALLOPURINOL 100 MG: 100 TABLET ORAL at 10:11

## 2021-06-21 RX ADMIN — CIPROFLOXACIN 2 DROP: 3 SOLUTION OPHTHALMIC at 10:11

## 2021-06-21 RX ADMIN — CIPROFLOXACIN 2 DROP: 3 SOLUTION OPHTHALMIC at 17:29

## 2021-06-21 RX ADMIN — Medication 5000 UNITS: at 10:11

## 2021-06-21 RX ADMIN — CIPROFLOXACIN 2 DROP: 3 SOLUTION OPHTHALMIC at 14:53

## 2021-06-21 ASSESSMENT — PAIN SCALES - GENERAL: PAINLEVEL_OUTOF10: 4

## 2021-06-21 ASSESSMENT — PAIN - FUNCTIONAL ASSESSMENT: PAIN_FUNCTIONAL_ASSESSMENT: 0-10

## 2021-06-21 NOTE — BH NOTE
LOS NOTE    Pt currently awake in her room sitting up in bed. Pt remains in behavioral control. Line of sight maintained per physician order for patient safety.

## 2021-06-21 NOTE — BH NOTE
Line of Sight Note    Patient is sitting in dayroom in wheelchair. Patient assisted by staff to restroom x1 and is now resting in the dayroom looking out the window. No signs of distress noted and respirations are even and unlabored. Line of sight continued for patient safety.

## 2021-06-21 NOTE — BH NOTE
LOS NOTE    Pt currently awake in her room resting in bed. Pt remains in behavioral control. Line of sight maintained per physician order for patient safety.

## 2021-06-21 NOTE — GROUP NOTE
Group Therapy Note    Date: 6/21/2021    Group Start Time: 1430  Group End Time: 3624  Group Topic: Cognitive Skills    LUNA Rey, CTRS        Group Therapy Note    Attendees: 6/16       Pt did not participate in Cognitive Skills Group at 1430 when encouraged by RT due to pt up in day room, looking out of window and drinking coffee. Pt was invited to attend group but declined , just wanted to watch out of window. Pt did yell out at window scene at random intervals-no distress noted or expressed by pt when asked. Pt was offered talk time as an alternative to group but declined.          Discipline Responsible: Psychoeducational Specialist        Signature:  Leslie Waldrop

## 2021-06-21 NOTE — PROGRESS NOTES
disorganized thought process and bizarre behavior. Referral Date : 06/18/21  Diagnosis: Acute psychosis  Follows Commands: Impaired  Subjective  Subjective: OK per RN to see the pt, per reports pt has been in a w/c in the unit, staff assist pt with transfers. Pain Screening  Patient Currently in Pain: Denies  Vital Signs  Patient Currently in Pain: Denies  Oxygen Therapy  O2 Device: None (Room air)  Patient Observation  Observations: Tremors noted R UE       Orientation  Orientation  Overall Orientation Status: Impaired  Orientation Level: Oriented to person;Disoriented to place; Disoriented to time;Disoriented to situation  Social/Functional History  Social/Functional History  Lives With: Spouse  Home Equipment: Rolling walker  Ambulation Assistance: Independent (Per pt she does not use a rolling walker)  Transfer Assistance: Independent  Additional Comments: Pt very [facility], disoriented, unable to provide much information regarding prior level of fucntion and living situation. Did report she was min to get up and walk on her own. Cognition        Objective          AROM RLE (degrees)  RLE AROM: WFL  AROM LLE (degrees)  LLE AROM : WFL  Strength RLE  Comment: Atleast 3/5  Strength LLE  Comment: Atleast 3/5  Strength Other  Other: Pt unable to follow commands ofr MMT,but pt dis lift B LE off the bed antigravity        Bed mobility  Rolling to Left: Minimal assistance  Rolling to Right: Minimal assistance  Supine to Sit: Minimal assistance  Sit to Supine:  (Left in a w/c, Staff to monitor pt,while she is in the dayroom.)  Scooting: Minimal assistance  Transfers  Sit to Stand: Minimal Assistance; Moderate Assistance (min to mod dependening on fatique and ability to follow cues)  Stand to sit: Minimal Assistance  Bed to Chair: Minimal assistance; Moderate assistance  Ambulation  Ambulation?: Yes  Ambulation 1  Surface: level tile  Device: Rolling Walker  Other Apparatus: Wheelchair follow (Therapist pulling w/c behind as pt walks.)  Assistance: Minimal assistance  Quality of Gait: Small shuffling stesp, decreased step length B LE, L >> R. NBOS  Gait Deviations: Decreased step length;Decreased step height;Slow Adriana (NBOS)  Distance: 40 ft, 26 ft, rests in w/c inbetween 2 ambulation. Balance  Posture: Fair  Sitting - Static: Good;-  Sitting - Dynamic: Fair  Standing - Static: Fair;+ (RW)  Standing - Dynamic: Fair;- (RW)        Plan   Plan  Times per week: 3 x/week  Specific instructions for Next Treatment: Strengthening/balance ex's  Safety Devices  Type of devices: Call light within reach, Gait belt, Left in chair    G-Code       OutComes Score                                                  AM-PAC Score             Goals  Short term goals  Time Frame for Short term goals: 8 visits  Short term goal 1: Pt able to perform supine<.sit CGA  Short term goal 2:  pt able to transfer at hospitals [facility] term goal 3: Pt able to [facility] with rolling walker distance of 100 ft x 2, CGA, with mod cues topick up legs and widen JOSUE. Short term goal 4: Pt to participate in balance/strengthening ex's to improve fucntion. Patient Goals   Patient goals : Can I go home? Daniel Morris        Therapy Time   Individual Concurrent Group Co-treatment   Time In 5231         Time Out 1445         Minutes 25         Timed Code Treatment Minutes: 10 Minutes       Jose Comer PT

## 2021-06-21 NOTE — PROGRESS NOTES
Daily Progress Note  6/21/2021    Patient Name: Frida Jaffe COMPLAINT: Acute psychosis         SUBJECTIVE:      Patient is seen today for a follow up assessment. She is cooperative with ADL care, nursing student is at the bedside helping her brush her teeth. She was evaluated by physical therapy today who recommends continued treatment secondary to decreased functional mobility, decreased strength, decreased safety awareness and coordination/balance issues. Nena Chaney endorses a poor appetite, she was also evaluated today by a dietitian. She is currently being followed by internal medicine, based on her recent lab results she is experienced a decrease in her hemoglobin from 12.4-9.3, the source of this is unknown. It is possible the anemia is contributing to increased fatigue. She remains confused with a significant lack of insight into her current capabilities and condition. Appetite:  [] Adequate/Unchanged  [] Increased  [x] Decreased      Sleep:       [] Adequate/Unchanged  [x] Fair  [] Poor      Group Attendance on Unit:   [] Yes  [] Selectively    [x] No    Medication Side Effects: Denies         Mental Status Exam  Level of consciousness: Alert and awake   Appearance: Appropriate attire for setting, resting in bed, with fair  grooming and hygiene   Behavior/Motor: Approachable, no psychomotor abnormalities   Attitude toward examiner: Cooperative, attentive, good eye contact  Speech: Loud, latent (hard of hearing)  Mood: Anxious  Affect: Mood congruent  Thought processes: Mostly tangential, she is redirectable to occasional linear responses with short closed ended questions  Thought content: Discharge focused, Denies homicidal ideation  Suicidal Ideation: Denies suicidal ideations, contracts for safety on the unit. Delusions: No evidence of delusions. Perceptual Disturbance: Difficult to assess, patient does not appear to be responding to internal stimuli.    Cognition: Oriented to Self, confused to general circumstance, understands that she is in a hospital  Memory: impaired   Insight: poor   Judgement: poor       Data   temperature is 98.5 °F (36.9 °C). Her blood pressure is 102/71 and her pulse is 69. Her respiration is 14. Labs:   Admission on 06/18/2021   Component Date Value Ref Range Status    POC Glucose 06/18/2021 94  65 - 105 mg/dL Final    Hemoglobin A1C 06/18/2021 5.6  4.0 - 6.0 % Final    Estimated Avg Glucose 06/18/2021 114  mg/dL Final    Comment: The ADA and AACC recommend providing the estimated average glucose result to permit better   patient understanding of their HBA1c result.       WBC 06/18/2021 6.5  3.5 - 11.0 k/uL Final    RBC 06/18/2021 3.85* 4.0 - 5.2 m/uL Final    Hemoglobin 06/18/2021 12.4  12.0 - 16.0 g/dL Final    Hematocrit 06/18/2021 36.8  36 - 46 % Final    MCV 06/18/2021 95.6  80 - 100 fL Final    MCH 06/18/2021 32.2  26 - 34 pg Final    MCHC 06/18/2021 33.7  31 - 37 g/dL Final    RDW 06/18/2021 13.2  11.5 - 14.9 % Final    Platelets 72/79/8871 255  150 - 450 k/uL Final    MPV 06/18/2021 8.3  6.0 - 12.0 fL Final    NRBC Automated 06/18/2021 NOT REPORTED  per 100 WBC Final    Differential Type 06/18/2021 NOT REPORTED   Final    Seg Neutrophils 06/18/2021 50  36 - 66 % Final    Lymphocytes 06/18/2021 33  24 - 44 % Final    Monocytes 06/18/2021 9* 1 - 7 % Final    Eosinophils % 06/18/2021 7* 0 - 4 % Final    Basophils 06/18/2021 1  0 - 2 % Final    Immature Granulocytes 06/18/2021 NOT REPORTED  0 % Final    Segs Absolute 06/18/2021 3.20  1.3 - 9.1 k/uL Final    Absolute Lymph # 06/18/2021 2.10  1.0 - 4.8 k/uL Final    Absolute Mono # 06/18/2021 0.60  0.1 - 1.3 k/uL Final    Absolute Eos # 06/18/2021 0.50* 0.0 - 0.4 k/uL Final    Basophils Absolute 06/18/2021 0.10  0.0 - 0.2 k/uL Final    Absolute Immature Granulocyte 06/18/2021 NOT REPORTED  0.00 - 0.30 k/uL Final    WBC Morphology 06/18/2021 NOT REPORTED   Final    RBC Morphology 06/18/2021 NOT REPORTED   Final    Platelet Estimate 28/64/8264 NOT REPORTED   Final    Glucose 06/18/2021 95  70 - 99 mg/dL Final    BUN 06/18/2021 13  8 - 23 mg/dL Final    CREATININE 06/18/2021 0.94* 0.50 - 0.90 mg/dL Final    Bun/Cre Ratio 06/18/2021 NOT REPORTED  9 - 20 Final    Calcium 06/18/2021 9.4  8.6 - 10.4 mg/dL Final    Sodium 06/18/2021 143  135 - 144 mmol/L Final    Potassium 06/18/2021 4.7  3.7 - 5.3 mmol/L Final    Chloride 06/18/2021 109* 98 - 107 mmol/L Final    CO2 06/18/2021 22  20 - 31 mmol/L Final    Anion Gap 06/18/2021 12  9 - 17 mmol/L Final    Alkaline Phosphatase 06/18/2021 74  35 - 104 U/L Final    ALT 06/18/2021 <5* 5 - 33 U/L Final    AST 06/18/2021 14  <32 U/L Final    Total Bilirubin 06/18/2021 0.38  0.3 - 1.2 mg/dL Final    Total Protein 06/18/2021 6.9  6.4 - 8.3 g/dL Final    Albumin 06/18/2021 4.1  3.5 - 5.2 g/dL Final    Albumin/Globulin Ratio 06/18/2021 NOT REPORTED  1.0 - 2.5 Final    GFR Non- 06/18/2021 58* >60 mL/min Final    GFR  06/18/2021 >60  >60 mL/min Final    GFR Comment 06/18/2021        Final    Comment: Average GFR for 79or more years old:   76 mL/min/1.73sq m  Chronic Kidney Disease:   <60 mL/min/1.73sq m  Kidney failure:   <15 mL/min/1.73sq m              eGFR calculated using average adult body mass.  Additional eGFR calculator available at:        [email or web address]            GFR Staging 06/18/2021 NOT REPORTED   Final    TSH 06/18/2021 4.25  0.30 - 5.00 mIU/L Final    POC Glucose 06/18/2021 125* 65 - 105 mg/dL Final    WBC 06/19/2021 5.9  3.5 - 11.0 k/uL Final    RBC 06/19/2021 3.27* 4.0 - 5.2 m/uL Final    Hemoglobin 06/19/2021 10.4* 12.0 - 16.0 g/dL Final    Hematocrit 06/19/2021 31.6* 36 - 46 % Final    MCV 06/19/2021 96.7  80 - 100 fL Final    MCH 06/19/2021 31.8  26 - 34 pg Final    MCHC 06/19/2021 32.9  31 - 37 g/dL Final    RDW 06/19/2021 13.7  11.5 - 14.9 % Final    Platelets 14/20/2303 227  150 - 450 k/uL Final    MPV 06/19/2021 7.8  6.0 - 12.0 fL Final    NRBC Automated 06/19/2021 NOT REPORTED  per 100 WBC Final    Differential Type 06/19/2021 NOT REPORTED   Final    Seg Neutrophils 06/19/2021 52  36 - 66 % Final    Lymphocytes 06/19/2021 31  24 - 44 % Final    Monocytes 06/19/2021 10* 1 - 7 % Final    Eosinophils % 06/19/2021 6* 0 - 4 % Final    Basophils 06/19/2021 1  0 - 2 % Final    Immature Granulocytes 06/19/2021 NOT REPORTED  0 % Final    Segs Absolute 06/19/2021 3.00  1.3 - 9.1 k/uL Final    Absolute Lymph # 06/19/2021 1.80  1.0 - 4.8 k/uL Final    Absolute Mono # 06/19/2021 0.60  0.1 - 1.3 k/uL Final    Absolute Eos # 06/19/2021 0.30  0.0 - 0.4 k/uL Final    Basophils Absolute 06/19/2021 0.10  0.0 - 0.2 k/uL Final    Absolute Immature Granulocyte 06/19/2021 NOT REPORTED  0.00 - 0.30 k/uL Final    WBC Morphology 06/19/2021 NOT REPORTED   Final    RBC Morphology 06/19/2021 NOT REPORTED   Final    Platelet Estimate 69/98/6558 NOT REPORTED   Final    Vit D, 25-Hydroxy 06/19/2021 31.0  30.0 - 100.0 ng/mL Final    Comment:    Reference Range:  Vitamin D status         Range   Deficiency              <20 ng/mL   Mild Deficiency       20-30 ng/mL   Sufficiency           ng/mL   Toxicity               >100 ng/mL      WBC 06/20/2021 5.5  3.5 - 11.0 k/uL Final    RBC 06/20/2021 3.04* 4.0 - 5.2 m/uL Final    Hemoglobin 06/20/2021 9.5* 12.0 - 16.0 g/dL Final    Hematocrit 06/20/2021 28.8* 36 - 46 % Final    MCV 06/20/2021 94.8  80 - 100 fL Final    MCH 06/20/2021 31.1  26 - 34 pg Final    MCHC 06/20/2021 32.8  31 - 37 g/dL Final    RDW 06/20/2021 13.4  11.5 - 14.9 % Final    Platelets 64/34/3330 205  150 - 450 k/uL Final    MPV 06/20/2021 8.4  6.0 - 12.0 fL Final    NRBC Automated 06/20/2021 NOT REPORTED  per 100 WBC Final    Differential Type 06/20/2021 NOT REPORTED   Final    Seg Neutrophils 06/20/2021 46  36 - 66 % Final    Lymphocytes 06/20/2021 36  24 - 44 % Final    Monocytes 06/20/2021 11* 1 - 7 % Final    Eosinophils % 06/20/2021 7* 0 - 4 % Final    Basophils 06/20/2021 0  0 - 2 % Final    Immature Granulocytes 06/20/2021 NOT REPORTED  0 % Final    Segs Absolute 06/20/2021 2.50  1.3 - 9.1 k/uL Final    Absolute Lymph # 06/20/2021 2.00  1.0 - 4.8 k/uL Final    Absolute Mono # 06/20/2021 0.60  0.1 - 1.3 k/uL Final    Absolute Eos # 06/20/2021 0.40  0.0 - 0.4 k/uL Final    Basophils Absolute 06/20/2021 0.00  0.0 - 0.2 k/uL Final    Absolute Immature Granulocyte 06/20/2021 NOT REPORTED  0.00 - 0.30 k/uL Final    WBC Morphology 06/20/2021 NOT REPORTED   Final    RBC Morphology 06/20/2021 NOT REPORTED   Final    Platelet Estimate 24/18/1248 NOT REPORTED   Final    WBC 06/21/2021 5.7  3.5 - 11.0 k/uL Final    RBC 06/21/2021 3.00* 4.0 - 5.2 m/uL Final    Hemoglobin 06/21/2021 9.3* 12.0 - 16.0 g/dL Final    Hematocrit 06/21/2021 28.8* 36 - 46 % Final    MCV 06/21/2021 95.9  80 - 100 fL Final    MCH 06/21/2021 31.2  26 - 34 pg Final    MCHC 06/21/2021 32.5  31 - 37 g/dL Final    RDW 06/21/2021 13.7  11.5 - 14.9 % Final    Platelets 92/59/2079 198  150 - 450 k/uL Final    MPV 06/21/2021 8.1  6.0 - 12.0 fL Final    NRBC Automated 06/21/2021 NOT REPORTED  per 100 WBC Final    Differential Type 06/21/2021 NOT REPORTED   Final    Seg Neutrophils 06/21/2021 42  36 - 66 % Final    Lymphocytes 06/21/2021 38  24 - 44 % Final    Monocytes 06/21/2021 11* 1 - 7 % Final    Eosinophils % 06/21/2021 8* 0 - 4 % Final    Basophils 06/21/2021 1  0 - 2 % Final    Immature Granulocytes 06/21/2021 NOT REPORTED  0 % Final    Segs Absolute 06/21/2021 2.40  1.3 - 9.1 k/uL Final    Absolute Lymph # 06/21/2021 2.20  1.0 - 4.8 k/uL Final    Absolute Mono # 06/21/2021 0.60  0.1 - 1.3 k/uL Final    Absolute Eos # 06/21/2021 0.40  0.0 - 0.4 k/uL Final    Basophils Absolute 06/21/2021 0.00  0.0 - 0.2 k/uL Final    Absolute Immature Granulocyte 06/21/2021 NOT REPORTED  0.00 - 0.30 k/uL Final    WBC Morphology 06/21/2021 NOT REPORTED   Final    RBC Morphology 06/21/2021 NOT REPORTED   Final    Platelet Estimate 32/04/9108 NOT REPORTED   Final         Reviewed patient's current plan of care and vital signs with nursing staff. Labs reviewed: [x] Yes, concern for dropping hemoglobin    Medications  Current Facility-Administered Medications: ciprofloxacin (CILOXAN) 0.3 % ophthalmic solution 2 drop, 2 drop, Right Eye, Q4H While awake  acetaminophen (TYLENOL) tablet 650 mg, 650 mg, Oral, Q4H PRN  ibuprofen (ADVIL;MOTRIN) tablet 400 mg, 400 mg, Oral, Q6H PRN  polyethylene glycol (GLYCOLAX) packet 17 g, 17 g, Oral, Daily PRN  aluminum & magnesium hydroxide-simethicone (MAALOX) 200-200-20 MG/5ML suspension 30 mL, 30 mL, Oral, Q6H PRN  hydrOXYzine (ATARAX) tablet 50 mg, 50 mg, Oral, TID PRN  traZODone (DESYREL) tablet 50 mg, 50 mg, Oral, Nightly PRN  allopurinol (ZYLOPRIM) tablet 100 mg, 100 mg, Oral, Daily  vitamin D3 (CHOLECALCIFEROL) tablet 5,000 Units, 1 tablet, Oral, Daily  OLANZapine (ZYPREXA) tablet 2.5 mg, 2.5 mg, Oral, Nightly    ASSESSMENT  Acute psychosis ([facility] [address]         PLAN  Patient symptoms are: Minimally improved  Continue current medication regimen. Monitor need and frequency of PRN medications. Internal medicine to continue following for medical management  Continue line of sight for fall precautions  Encourage participation in groups and milieu. Attempt to develop insight. Psycho-education conducted. Supportive Therapy conducted. Probable discharge is per attending physician, PASSR pending. Adult Protective Services will be notified by social work  Follow-up daily while inpatient. Patient continues to be monitored in the inpatient psychiatric facility at [facility] for safety and stabilization.  Patient continues to need, on a daily basis, active treatment furnished directly by or requiring the supervision of inpatient psychiatric personnel. Electronically signed by PEREZ Stearns CNP on 6/21/2021 at 5:06 PM    **This report has been created using voice recognition software. It may contain minor errors which are inherent in voice recognition technology. **  I independently saw and evaluated the patient. I reviewed the midlevel provider's documentation above. Any additional comments or changes to the midlevel provider's documentation are stated below otherwise agree with assessment.      -Noted that the patient was brought to the hospital after being found wandering. She accused her  of hurting her. She was noted to be bizarre in behavior and disorganized in her thought process. The patient was seen in the day area. She is wheelchair-bound. She is loud and has hearing difficulties. She has no insight into her condition. She is discharge focused. The patient has been compliant with her Zyprexa 2.5 mg at nighttime. PLAN  No changes have been made to the patient's medications  Attempt to develop insight  Psycho-education conducted. Supportive Therapy conducted.   Probable discharge is in 5 to 7 days  Follow-up daily while on inpatient unit    Electronically signed by Grazyna Rahman MD on 6/21/21 at 6:39 PM EDT

## 2021-06-21 NOTE — GROUP NOTE
Group Therapy Note    Date: 6/21/2021    Group Start Time: 0900  Group End Time: 9648  Group Topic: Community Meeting    [facility]    [place], [address]        Group Therapy Note    Attendees: 14/23         Pt did not participate in Community Meeting/Goals Group at 900am when encouraged by RT due to resting in room. Pt was offered talk time as an alternative to group but declined.       Discipline Responsible: Psychoeducational Specialist      Signature:  Zuleyka Lim

## 2021-06-21 NOTE — GROUP NOTE
Group Therapy Note    Date: 6/21/2021    Group Start Time: 1100  Group End Time: 398  Group Topic: Cognitive Skills    LUNA Nava, CTRS        Group Therapy Note    Attendees: 11/23         Pt did not participate in Cognitive Skills Group at 1100am when encouraged by RT due to resting in room. Pt was offered talk time as an alternative to group but declined.          Discipline Responsible: Psychoeducational Specialist        Signature:  Gabrielle Alfred

## 2021-06-21 NOTE — PLAN OF CARE
Nutrition Problem #1: Severe malnutrition  Intervention: Food and/or Nutrient Delivery: Modify Current Diet, Modify Oral Nutrition Supplement  Nutritional Goals: Meet greater than 75% of estimated nutrition needs

## 2021-06-21 NOTE — BH NOTE
Line of Sight Note    Patient is currently sitting in dayroom watching TV. Patient was assisted x1 to restroom and putting in dentures. No signs of distress noted. Line of sight continued for patient safety.

## 2021-06-21 NOTE — PLAN OF CARE
Problem: Falls - Risk of:  Goal: Will remain free from falls  Description: Will remain free from falls  Outcome: Ongoing   Patient remains free of falls at this time. She is a one person assist for transfers and to use the bathroom. She vocalizes to staff when she needs to use the restroom and would like to go back to bed. She has been getting up in her wheelchair for meals and enjoys sitting by the window. Problem: Altered Mood, Deterioration in Function:  Goal: Able to verbalize reality based thinking  Description: Able to verbalize reality based thinking  Outcome: Ongoing   Patient is oriented to herself and the place she is at. She often talks about calling her son and about her situation at home. Patient is fifteen minute safety check. Problem: Altered Mood, Psychotic Behavior:  Goal: Able to verbalize decrease in frequency and intensity of hallucinations  Description: Able to verbalize decrease in frequency and intensity of hallucinations  Outcome: Ongoing   Patient denies hallucinations at this time and has not been observed responding to any internal stimuli.

## 2021-06-21 NOTE — PROGRESS NOTES
Comprehensive Nutrition Assessment    Type and Reason for Visit:  Consult (poor intake)    Nutrition Recommendations/Plan:   Will provide food that is Easy to Chew and try alternative supplements on all trays. Nutrition Assessment:  Spoke to RN who states pt needs assistance filling out menu and getting to the table at meal times. He states pt likes toast with butter and sugar which has been provided. Pt's dentures fit poorly. RN is unsure if pt has been drinking supplements. Malnutrition Assessment:  Malnutrition Status:  Severe malnutrition    Context:  Acute Illness     Findings of the 6 clinical characteristics of malnutrition:  Energy Intake:  7 - 50% or less of estimated energy requirements for 5 or more days  Weight Loss:  7 - Greater than 5% over 1 month     Body Fat Loss:  7 - Moderate body fat loss Orbital, Triceps   Muscle Mass Loss:  7 - Moderate muscle mass loss Temples (temporalis), Clavicles (pectoralis & deltoids)  Fluid Accumulation:  No significant fluid accumulation     Strength:  Not Performed    Estimated Daily Nutrient Needs:  Energy (kcal):   Unable to assess; Weight Used for Energy Requirements:        Protein (g):  Unable to assess; Weight Used for Protein Requirements:             Nutrition Related Findings:  no edema, Labs (6/18) Glu 95, POC Glu 125 Meds: Reviewed      Wounds:  None       Current Nutrition Therapies:    Adult Oral Nutrition Supplement; Standard High Calorie/High Protein Oral Supplement  ADULT DIET; Easy to Chew    Anthropometric Measures:  · Height:  (unknown by patient)  · Current Body Weight:   unknown    Nutrition Diagnosis:   · Severe malnutrition related to inadequate protein-energy intake, psychological cause or life stress as evidenced by poor intake prior to admission, intake 0-25%, weight loss, moderate loss of subcutaneous fat, moderate muscle loss    Nutrition Interventions:   Food and/or Nutrient Delivery:  Modify Current Diet, Modify Oral Nutrition Supplement  Nutrition Education/Counseling:  Education not indicated   Coordination of Nutrition Care:  Continue to monitor while inpatient    Goals:  Meet greater than 75% of estimated nutrition needs       Nutrition Monitoring and Evaluation:   Food/Nutrient Intake Outcomes:  Food and Nutrient Intake, Supplement Intake  Physical Signs/Symptoms Outcomes:  Biochemical Data, GI Status, Skin, Weight, Fluid Status or Edema     Discharge Planning:    Continue current diet     Electronically signed by Jeo Elliott RD, LD on 6/21/21 at 2:02 PM EDT    Contact: [phone]

## 2021-06-21 NOTE — BH NOTE
Line of Sight Note    Patient is sitting in dayroom by the windows, no signs of distress noted. Patient assisted to restroom x2. Line of sight continued per patient safety?

## 2021-06-21 NOTE — PLAN OF CARE
Problem: Altered Mood, Deterioration in Function:  Goal: Able to verbalize reality based thinking  Description: Able to verbalize reality based thinking  6/21/2021 0054 by Richardine Severe, RN  Outcome: Ongoing  Note:   Patient very repetitive when speaking with writer. Patient states she needs to go home to [place] care of business\". Patient states she needs to clean the house. She will then say she can't go home because her  is there and he is mean to her. Patient states her  won't take her to see her psychologist, counselor, eye doctor, or to get her medications. Patient focused on contacting her son and gave Maria A Caballero the number of \"[phone]\". Patient states her son needs to know she is here so he can pick her up. Patient states her  will not come get her because he doesn't care about her. Patient also focused on calling wanting her neighbor called because they are able to get [facility] police out to her . Patient forgetful of conversations. Patient very worried about being in the hospital and not being able to get home. Writer frequently reassured patient that she is safe here and her arrangements for discharge will be made by staff before she leaves. Problem: Falls - Risk of:  Goal: Will remain free from falls  Description: Will remain free from falls  6/21/2021 0054 by Richardine Severe, RN  Outcome: Ongoing  Note: Pt remains free of falls and verbalizes understanding of individual fall risks. Pt wearing non skid footwear and encouraged to seek out staff for any assistance needed. Problem: Falls - Risk of:  Goal: Absence of physical injury  Description: Absence of physical injury  Outcome: Ongoing  Note: Patient remains free from physical injury at this time.

## 2021-06-22 LAB
ABSOLUTE EOS #: 0.4 K/UL (ref 0–0.4)
ABSOLUTE IMMATURE GRANULOCYTE: ABNORMAL K/UL (ref 0–0.3)
ABSOLUTE LYMPH #: 2.1 K/UL (ref 1–4.8)
ABSOLUTE MONO #: 0.6 K/UL (ref 0.1–1.3)
BASOPHILS # BLD: 1 % (ref 0–2)
BASOPHILS ABSOLUTE: 0 K/UL (ref 0–0.2)
DIFFERENTIAL TYPE: ABNORMAL
EOSINOPHILS RELATIVE PERCENT: 6 % (ref 0–4)
HCT VFR BLD CALC: 31.8 % (ref 36–46)
HEMOGLOBIN: 10.5 G/DL (ref 12–16)
IMMATURE GRANULOCYTES: ABNORMAL %
LYMPHOCYTES # BLD: 35 % (ref 24–44)
MCH RBC QN AUTO: 31.5 PG (ref 26–34)
MCHC RBC AUTO-ENTMCNC: 32.9 G/DL (ref 31–37)
MCV RBC AUTO: 95.6 FL (ref 80–100)
MONOCYTES # BLD: 9 % (ref 1–7)
NRBC AUTOMATED: ABNORMAL PER 100 WBC
PDW BLD-RTO: 13.3 % (ref 11.5–14.9)
PLATELET # BLD: 223 K/UL (ref 150–450)
PLATELET ESTIMATE: ABNORMAL
PMV BLD AUTO: 8.3 FL (ref 6–12)
RBC # BLD: 3.33 M/UL (ref 4–5.2)
RBC # BLD: ABNORMAL 10*6/UL
SEG NEUTROPHILS: 49 % (ref 36–66)
SEGMENTED NEUTROPHILS ABSOLUTE COUNT: 3 K/UL (ref 1.3–9.1)
WBC # BLD: 6.2 K/UL (ref 3.5–11)
WBC # BLD: ABNORMAL 10*3/UL

## 2021-06-22 PROCEDURE — APPSS15 APP SPLIT SHARED TIME 0-15 MINUTES: Performed by: NURSE PRACTITIONER

## 2021-06-22 PROCEDURE — 6370000000 HC RX 637 (ALT 250 FOR IP)

## 2021-06-22 PROCEDURE — 6370000000 HC RX 637 (ALT 250 FOR IP): Performed by: PSYCHIATRY & NEUROLOGY

## 2021-06-22 PROCEDURE — 1240000000 HC EMOTIONAL WELLNESS R&B

## 2021-06-22 PROCEDURE — 85025 COMPLETE CBC W/AUTO DIFF WBC: CPT

## 2021-06-22 PROCEDURE — 99231 SBSQ HOSP IP/OBS SF/LOW 25: CPT | Performed by: INTERNAL MEDICINE

## 2021-06-22 PROCEDURE — 99232 SBSQ HOSP IP/OBS MODERATE 35: CPT | Performed by: PSYCHIATRY & NEUROLOGY

## 2021-06-22 PROCEDURE — 36415 COLL VENOUS BLD VENIPUNCTURE: CPT

## 2021-06-22 RX ADMIN — Medication 5000 UNITS: at 08:53

## 2021-06-22 RX ADMIN — ALLOPURINOL 100 MG: 100 TABLET ORAL at 08:54

## 2021-06-22 RX ADMIN — CIPROFLOXACIN 2 DROP: 3 SOLUTION OPHTHALMIC at 19:02

## 2021-06-22 RX ADMIN — OLANZAPINE 2.5 MG: 5 TABLET, FILM COATED ORAL at 20:33

## 2021-06-22 RX ADMIN — ACETAMINOPHEN 650 MG: 325 TABLET ORAL at 21:56

## 2021-06-22 RX ADMIN — CIPROFLOXACIN 2 DROP: 3 SOLUTION OPHTHALMIC at 14:11

## 2021-06-22 RX ADMIN — TRAZODONE HYDROCHLORIDE 50 MG: 50 TABLET ORAL at 20:33

## 2021-06-22 RX ADMIN — CIPROFLOXACIN 2 DROP: 3 SOLUTION OPHTHALMIC at 20:37

## 2021-06-22 RX ADMIN — IBUPROFEN 400 MG: 400 TABLET, FILM COATED ORAL at 20:33

## 2021-06-22 RX ADMIN — HYDROXYZINE HYDROCHLORIDE 50 MG: 50 TABLET, FILM COATED ORAL at 20:33

## 2021-06-22 RX ADMIN — CIPROFLOXACIN 2 DROP: 3 SOLUTION OPHTHALMIC at 10:08

## 2021-06-22 ASSESSMENT — PAIN SCALES - GENERAL
PAINLEVEL_OUTOF10: 4
PAINLEVEL_OUTOF10: 3

## 2021-06-22 NOTE — BH NOTE
DISCHARGE UPDATE:  - Writer checks on PASSR status as well as [facility] assessment and when they will be calling.  - Client only has Medicare and they only pay 100pct for first 20 days, the family has to pay 20pct because no Medicaid or secondary health insurance.  took her out of ECF after 20 days because did not want his house, investments, etc. given to the [employer] speaks with son to advise him of the situation and ECF placement is going to attempted again and he needs to speak with his father regarding long-term care for his mother is needed.

## 2021-06-22 NOTE — GROUP NOTE
Group Therapy Note    Date: 6/22/2021    Group Start Time: 0900  Group End Time: 5844  Group Topic: Community Meeting    LUNA Pichardo , CTRS    Pt did not attend 0900 cognitive skills group d/t resting in room despite staff invitation to attend. 1:1 talk time offered as alternative to group session, pt declined.               Signature:  Vivien Royal

## 2021-06-22 NOTE — BH NOTE
Patient in day area visiting with , patient appeared to be happy to see him. During visit patient was vocal about living arrangements, stating that \" water need to be fixed\", patient also states at one point in during visit \" don't hit me here\". Patient does not appear to be in any duress, and continues to visit with  No issues. Milieu presence maintained during visiting.

## 2021-06-22 NOTE — PLAN OF CARE
Problem: Falls - Risk of:  Goal: Absence of physical injury  Description: Absence of physical injury  6/22/2021 1100 by Arlys Harness, LPN  Outcome: Ongoing  Note: Patient remains free from falls, utilizes wheelchair, with staff assist. Transfers with assistance. Visual checks maintained. Problem: Altered Mood, Psychotic Behavior:  Goal: Able to verbalize decrease in frequency and intensity of hallucinations  Description: Able to verbalize decrease in frequency and intensity of hallucinations  6/22/2021 1100 by Arlys Harness, LPN  Outcome: Ongoing  Note: No violent or negative behaviors noted at this time. Will cont to provide safe, calm, environment and use verbal de-escalation techniques when needed. Support and reassurance given as needed.

## 2021-06-22 NOTE — BH NOTE
DISCHARGE PLANNING UPDATE:  - Writer speaks with client's  regarding her care after leaving the hospital.  Royal Lopez speaks with him about her placement into a ECF but she will need to get Medicaid secondary. Writer recommends client speak with a geriatric  regarding Medicaid financial restricted once a client is placed on Medicaid and admitted to a long-term care nursing facility.  - Writer will contact [facility] in  to see if they would consider readmitting client if Medicaid is approved. Writer will continue to check for other ECF placements in the [employer] area.

## 2021-06-22 NOTE — CONSULTS
[facility] Internal Medicine    CONSULTATION / HISTORY AND PHYSICAL EXAMINATION            Date:   6/22/2021  Patient name:  Jose Osei  Date of admission:  6/18/2021  2:25 AM  MRN:   [id number]  Account:  [de-identified]  YOB: 1945  PCP:    No primary care provider on file. Room:   [address] [place] [address]  Code Status:    Full Code    Physician Requesting Consult: Rigoberto Callejas MD    Reason for Consult:  medical management    Chief Complaint:     No chief complaint on file. Medical management    History Obtained From:     Patient medical record nursing staff    History of Present Illness:     Patient is uncooperative unable to get history of presenting illness mostly noted from nursing staff and medical records history of diabetes and hypertension not on any medication no hypoglycemia noted elderly lady with a false dentures and deconditioning    Past Medical History:     Past Medical History:   Diagnosis Date    Diabetes mellitus ([facility] [address]     Hypertension     Psychiatric problem         Past Surgical History:     History reviewed. No pertinent surgical history. Medications Prior to Admission:     Prior to Admission medications    Medication Sig Start Date End Date Taking?  Authorizing Provider   allopurinol (ZYLOPRIM) 100 MG tablet Take 100 mg by mouth daily    Historical Provider, MD   amLODIPine (NORVASC) 5 MG tablet Take 5 mg by mouth daily    Historical Provider, MD   Cholecalciferol (VITAMIN D3) 1.25 MG (90211 UT) CAPS Take by mouth    Historical Provider, MD   divalproex (DEPAKOTE ER) 500 MG extended release tablet Take 500 mg by mouth daily    Historical Provider, MD   metFORMIN (GLUCOPHAGE) 500 MG tablet Take 500 mg by mouth 2 times daily (with meals)    Historical Provider, MD   OLANZapine (ZYPREXA) 2.5 MG tablet Take 2.5 mg by mouth nightly    Historical Provider, MD   PARoxetine (PAXIL) 20 MG tablet Take 20 mg by mouth every morning    Historical Provider, MD Allergies:     Patient has no known allergies. Social History:     Tobacco:    reports that she has never smoked. She has never used smokeless tobacco.  Alcohol:      has no history on file for alcohol use. Drug Use:  has no history on file for drug use. Family History:     History reviewed. No pertinent family history. Review of Systems:     Patient is uncooperative unable to get review of systems       Physical Exam:     /70   Pulse 82   Temp 98 °F (36.7 °C) (Oral)   Resp 14   Temp (24hrs), Av °F (36.7 °C), Min:98 °F (36.7 °C), Max:98 °F (36.7 °C)    No results for input(s): POCGLU in the last 72 hours. No intake or output data in the 24 hours ending 21 1650    General Appearance:  alert, well appearing, and in no acute distress  Mental status: oriented to person, place, and time with normal affect  Head:  normocephalic, atraumatic. Eye: no icterus, redness, pupils equal and reactive, extraocular eye movements intact, conjunctiva clear  Ear: normal external ear, no discharge, hearing intact  Nose:  no drainage noted  Mouth: mucous membranes moist  Dentures noted in the mouth  Neck: supple, no carotid bruits, thyroid not palpable  Lungs: Bilateral equal air entry, clear to ausculation, no wheezing, rales or rhonchi, normal effort  Cardiovascular: normal rate, regular rhythm, no murmur, gallop, rub.   Abdomen: Soft, nontender, nondistended, normal bowel sounds, no hepatomegaly or splenomegaly  Neurologic: Unable to do neuro exam no focal deficit skin: No gross lesions, rashes, bruising or bleeding on exposed skin area  Extremities:  peripheral pulses palpable, no pedal edema or calf pain with palpation      Investigations:      Laboratory Testing:  Recent Results (from the past 24 hour(s))   CBC Auto Differential    Collection Time: 21  8:09 AM   Result Value Ref Range    WBC 6.2 3.5 - 11.0 k/uL    RBC 3.33 (L) 4.0 - 5.2 m/uL    Hemoglobin 10.5 (L) 12.0 - 16.0 g/dL Hematocrit 31.8 (L) 36 - 46 %    MCV 95.6 80 - 100 fL    MCH 31.5 26 - 34 pg    MCHC 32.9 31 - 37 g/dL    RDW 13.3 11.5 - 14.9 %    Platelets 924 803 - 464 k/uL    MPV 8.3 6.0 - 12.0 fL    NRBC Automated NOT REPORTED per 100 WBC    Differential Type NOT REPORTED     Seg Neutrophils 49 36 - 66 %    Lymphocytes 35 24 - 44 %    Monocytes 9 (H) 1 - 7 %    Eosinophils % 6 (H) 0 - 4 %    Basophils 1 0 - 2 %    Immature Granulocytes NOT REPORTED 0 %    Segs Absolute 3.00 1.3 - 9.1 k/uL    Absolute Lymph # 2.10 1.0 - 4.8 k/uL    Absolute Mono # 0.60 0.1 - 1.3 k/uL    Absolute Eos # 0.40 0.0 - 0.4 k/uL    Basophils Absolute 0.00 0.0 - 0.2 k/uL    Absolute Immature Granulocyte NOT REPORTED 0.00 - 0.30 k/uL    WBC Morphology NOT REPORTED     RBC Morphology NOT REPORTED     Platelet Estimate NOT REPORTED            Consultations:   IP CONSULT TO INTERNAL MEDICINE  IP CONSULT TO INTERNAL MEDICINE  IP CONSULT TO DIETITIAN  Assessment :      Primary Problem  Acute psychosis ([facility] [address]    Active Hospital Problems    Diagnosis Date Noted    Severe malnutrition ([facility] [address] [E43] 06/18/2021    Acute psychosis ([facility] [address] [F23] 06/17/2021       Plan:     Patient is a very poor historian  History noted from the medical charts and nursing staff  History of diabetes mellitus not on any medication we will check hemoglobin A1c preprandial postprandial blood sugar check  Hypertension controlled  Patient needs assistance with feeding and keeping her hydrated  Recommended nursing staff assist with nutrition and hydration  Rule out electrolyte imbalance CBC BMP and LFT TSH ordered  June 22  Labs reviewed encourage oral nutrition A1c 5.6  We will sign off please call as needed    Florina Kumar MD  6/22/2021  4:50 PM    Copy sent to Dr. Clare Robbins primary care provider on file. Please note that this chart was generated using voice recognition Dragon dictation software.   Although every effort was made to ensure the accuracy of this automated transcription, some errors in transcription may have occurred.

## 2021-06-22 NOTE — PLAN OF CARE
Problem: Altered Mood, Deterioration in Function:  Goal: Able to verbalize reality based thinking  Description: Able to verbalize reality based thinking  6/21/2021 2351 by Samy Zavala RN  Outcome: Ongoing  Note: Patient is focused on contacting her son. Patient states she does not want to go home with her  because it is not safe. She says she wants her son to pick her up and go to his house. Patient also very focused on leaving the hospital for her eyes stating she needs to see a doctor for her cataracts. Patient very apologetic to writer when asking for help with tasks or asking for things such as a snack or drink. Problem: Altered Mood, Psychotic Behavior:  Goal: Able to verbalize decrease in frequency and intensity of hallucinations  Description: Able to verbalize decrease in frequency and intensity of hallucinations  6/21/2021 2351 by Samy Zavala RN  Outcome: Ongoing  Note: Patient denies experiencing hallucinations at this time. Patient does not appear to be responding to internal stimuli. Problem: Falls - Risk of:  Goal: Will remain free from falls  Description: Will remain free from falls  6/21/2021 2351 by Samy Zavala RN  Outcome: Ongoing  Note: Pt remains free of falls and verbalizes understanding of individual fall risks. Pt wearing non skid footwear. Bed is locked and in lowest position. Pt makes toileting needs known. Pt uses staff assist x1 and wheelchair. Problem: Falls - Risk of:  Goal: Absence of physical injury  Description: Absence of physical injury  Outcome: Ongoing  Note: Patient remains free from physical injury at this time. Will continue to monitor.

## 2021-06-22 NOTE — GROUP NOTE
Group Therapy Note    Date: 6/22/2021    Group Start Time: 1100  Group End Time: 5602  Group Topic: Psychotherapy    LUNA BHI AKASH Leblanc        Group Therapy Note         Patient refused to attend psychotherapy group after encouragement from staff. 1:1 talk time offered but refused. Signature:   Edith Leblanc

## 2021-06-22 NOTE — BH NOTE
Patient's son Gabrieal Rodas called to speak with patient. Writer explained to that her son did not have her code number for us to give her the phone call. Patient verbalized understanding and gave writer verbal consent to give her son patient's code number. Patient then gave okay to transfer the call to her. Patient spoke with her son then handed the phone to this writer and said to talk to the son. When talking with patient's son, Nabeel Zhang, he explained that he is the patient's only support person. Her son explained that the patient's , his dad, and his sister \"don't want anything to do with the patient and don't take care of her\". He stated he wants to make sure his mom is safe and wants to become her POA. He stated her home with her  is clean and safe, but her  Alphonso Davila wants her around so he can afford things\". The son stated he spoke with the patient's  about her being in the hospital and the  told him, Jonh Gamble can stay there forever\". Patient's son stated the patient has been trying to get away from her  for 10 years but no one will believe what she says about the . Patient's son stated he wants to help his mom get into a nursing home because it would be the safest thing for her and she would be able to be taken care of. Writer spoke with patient after phone call. Writer told patient her son wants to be able to help her. Patient stated \"yes, he is very nice. He helps me with everything. I don't want to go home with my , I want to go to his house\". Writer explained to patient that for her son to be able to be involved in her care, she would have to sign a release of information for her son. Patient verbalized understanding. CARLOTTA was signed for her son, Gabriela Rodas. His phone # is [phone].

## 2021-06-22 NOTE — GROUP NOTE
Group Therapy Note    Date: 6/22/2021    Group Start Time: 1430  Group End Time: 8448  Group Topic: Healthy Living/Wellness    LUNA Chao, RACHAELS        Group Therapy Note    Attendees: 13/17         Pt did not participate in Healthy Living/Wellness Group at 1430 when encouraged by RT due to pt resting in room . Pt was offered talk time as an alternative to group but declined.          Discipline Responsible: Psychoeducational Specialist        Signature:  Ophelia Mauricio

## 2021-06-22 NOTE — GROUP NOTE
Group Therapy Note    Date: 6/22/2021    Group Start Time: 1100  Group End Time: 0559  Group Topic: Cognitive Skills    LUNA Childress, CTRS        Group Therapy Note    Attendees: 14/20         Pt did not participate in Cognitive Skills Group at 1100am when encouraged by RT due to pt resting in room . Pt was offered talk time as an alternative to group but declined.          Discipline Responsible: Psychoeducational Specialist        Signature:  Aleisha Linares

## 2021-06-22 NOTE — PROGRESS NOTES
Daily Progress Note  6/22/2021    Patient Name: Glenny Orosco COMPLAINT: Acute psychosis         SUBJECTIVE:      Patient is seen today for a follow up assessment. She remains cooperative with ADL care though requires much assistance and remains unsteady with a line of sight. She is cooperative and out for meals. Staff did speak with her son who indicates that he is the only one that helps care for her and that her  does not assist with her ADLs. Her son is agreeable to facility placement for safety. She has little insight into her condition. She remains cooperative with taking medications. This Gloria Singh did attempt to communicate with her through writing, she identified that she is unable to read. We continued with verbal conversation though she is extremely hard of hearing. She will remain a line of sight for fall precaution. Appetite:  [] Adequate/Unchanged  [] Increased  [x] Decreased      Sleep:       [] Adequate/Unchanged  [x] Fair  [] Poor      Group Attendance on Unit:   [] Yes  [] Selectively    [x] No    Medication Side Effects: Denies         Mental Status Exam  Level of consciousness: Resting, easily arousable to verbal stimulus  Appearance: Appropriate attire for setting, resting in bed, with fair  grooming and hygiene   Behavior/Motor: Approachable, psychomotor retardation, unsteady gait  Attitude toward examiner: Cooperative, somewhat attentive, good eye contact  Speech: Loud, latent (hard of hearing)  Mood: Anxious  Affect: Mood congruent  Thought processes: Tangential, linear at times  Thought content: Was focused on discharging home yesterday, today appears more content with hospitalization, Denies homicidal ideation  Suicidal Ideation: Denies suicidal ideations, contracts for safety on the unit.    Delusions: Difficult to assess, it is not clear that she understands assessment questions  Perceptual Disturbance: Difficult to assess, patient does not appear to be responding to internal stimuli. Cognition: Oriented to Self, confused to general circumstance, understands that she is in a hospital  Memory: impaired   Insight: poor   Judgement: poor       Data   oral temperature is 98 °F (36.7 °C). Her blood pressure is 127/70 and her pulse is 82. Her respiration is 14. Labs:   Admission on 06/18/2021   Component Date Value Ref Range Status    POC Glucose 06/18/2021 94  65 - 105 mg/dL Final    Hemoglobin A1C 06/18/2021 5.6  4.0 - 6.0 % Final    Estimated Avg Glucose 06/18/2021 114  mg/dL Final    Comment: The ADA and AACC recommend providing the estimated average glucose result to permit better   patient understanding of their HBA1c result.       WBC 06/18/2021 6.5  3.5 - 11.0 k/uL Final    RBC 06/18/2021 3.85* 4.0 - 5.2 m/uL Final    Hemoglobin 06/18/2021 12.4  12.0 - 16.0 g/dL Final    Hematocrit 06/18/2021 36.8  36 - 46 % Final    MCV 06/18/2021 95.6  80 - 100 fL Final    MCH 06/18/2021 32.2  26 - 34 pg Final    MCHC 06/18/2021 33.7  31 - 37 g/dL Final    RDW 06/18/2021 13.2  11.5 - 14.9 % Final    Platelets 19/80/3657 255  150 - 450 k/uL Final    MPV 06/18/2021 8.3  6.0 - 12.0 fL Final    NRBC Automated 06/18/2021 NOT REPORTED  per 100 WBC Final    Differential Type 06/18/2021 NOT REPORTED   Final    Seg Neutrophils 06/18/2021 50  36 - 66 % Final    Lymphocytes 06/18/2021 33  24 - 44 % Final    Monocytes 06/18/2021 9* 1 - 7 % Final    Eosinophils % 06/18/2021 7* 0 - 4 % Final    Basophils 06/18/2021 1  0 - 2 % Final    Immature Granulocytes 06/18/2021 NOT REPORTED  0 % Final    Segs Absolute 06/18/2021 3.20  1.3 - 9.1 k/uL Final    Absolute Lymph # 06/18/2021 2.10  1.0 - 4.8 k/uL Final    Absolute Mono # 06/18/2021 0.60  0.1 - 1.3 k/uL Final    Absolute Eos # 06/18/2021 0.50* 0.0 - 0.4 k/uL Final    Basophils Absolute 06/18/2021 0.10  0.0 - 0.2 k/uL Final    Absolute Immature Granulocyte 06/18/2021 NOT REPORTED  0.00 - 0.30 k/uL Final    WBC Morphology 06/18/2021 NOT REPORTED   Final    RBC Morphology 06/18/2021 NOT REPORTED   Final    Platelet Estimate 17/17/6219 NOT REPORTED   Final    Glucose 06/18/2021 95  70 - 99 mg/dL Final    BUN 06/18/2021 13  8 - 23 mg/dL Final    CREATININE 06/18/2021 0.94* 0.50 - 0.90 mg/dL Final    Bun/Cre Ratio 06/18/2021 NOT REPORTED  9 - 20 Final    Calcium 06/18/2021 9.4  8.6 - 10.4 mg/dL Final    Sodium 06/18/2021 143  135 - 144 mmol/L Final    Potassium 06/18/2021 4.7  3.7 - 5.3 mmol/L Final    Chloride 06/18/2021 109* 98 - 107 mmol/L Final    CO2 06/18/2021 22  20 - 31 mmol/L Final    Anion Gap 06/18/2021 12  9 - 17 mmol/L Final    Alkaline Phosphatase 06/18/2021 74  35 - 104 U/L Final    ALT 06/18/2021 <5* 5 - 33 U/L Final    AST 06/18/2021 14  <32 U/L Final    Total Bilirubin 06/18/2021 0.38  0.3 - 1.2 mg/dL Final    Total Protein 06/18/2021 6.9  6.4 - 8.3 g/dL Final    Albumin 06/18/2021 4.1  3.5 - 5.2 g/dL Final    Albumin/Globulin Ratio 06/18/2021 NOT REPORTED  1.0 - 2.5 Final    GFR Non- 06/18/2021 58* >60 mL/min Final    GFR  06/18/2021 >60  >60 mL/min Final    GFR Comment 06/18/2021        Final    Comment: Average GFR for 79or more years old:   76 mL/min/1.73sq m  Chronic Kidney Disease:   <60 mL/min/1.73sq m  Kidney failure:   <15 mL/min/1.73sq m              eGFR calculated using average adult body mass.  Additional eGFR calculator available at:        [email or web address]            GFR Staging 06/18/2021 NOT REPORTED   Final    TSH 06/18/2021 4.25  0.30 - 5.00 mIU/L Final    POC Glucose 06/18/2021 125* 65 - 105 mg/dL Final    WBC 06/19/2021 5.9  3.5 - 11.0 k/uL Final    RBC 06/19/2021 3.27* 4.0 - 5.2 m/uL Final    Hemoglobin 06/19/2021 10.4* 12.0 - 16.0 g/dL Final    Hematocrit 06/19/2021 31.6* 36 - 46 % Final    MCV 06/19/2021 96.7  80 - 100 fL Final    MCH 06/19/2021 31.8  26 - 34 pg Final    MCHC 06/19/2021 32.9 31 - 37 g/dL Final    RDW 06/19/2021 13.7  11.5 - 14.9 % Final    Platelets 04/72/2611 227  150 - 450 k/uL Final    MPV 06/19/2021 7.8  6.0 - 12.0 fL Final    NRBC Automated 06/19/2021 NOT REPORTED  per 100 WBC Final    Differential Type 06/19/2021 NOT REPORTED   Final    Seg Neutrophils 06/19/2021 52  36 - 66 % Final    Lymphocytes 06/19/2021 31  24 - 44 % Final    Monocytes 06/19/2021 10* 1 - 7 % Final    Eosinophils % 06/19/2021 6* 0 - 4 % Final    Basophils 06/19/2021 1  0 - 2 % Final    Immature Granulocytes 06/19/2021 NOT REPORTED  0 % Final    Segs Absolute 06/19/2021 3.00  1.3 - 9.1 k/uL Final    Absolute Lymph # 06/19/2021 1.80  1.0 - 4.8 k/uL Final    Absolute Mono # 06/19/2021 0.60  0.1 - 1.3 k/uL Final    Absolute Eos # 06/19/2021 0.30  0.0 - 0.4 k/uL Final    Basophils Absolute 06/19/2021 0.10  0.0 - 0.2 k/uL Final    Absolute Immature Granulocyte 06/19/2021 NOT REPORTED  0.00 - 0.30 k/uL Final    WBC Morphology 06/19/2021 NOT REPORTED   Final    RBC Morphology 06/19/2021 NOT REPORTED   Final    Platelet Estimate 59/49/7141 NOT REPORTED   Final    Vit D, 25-Hydroxy 06/19/2021 31.0  30.0 - 100.0 ng/mL Final    Comment:    Reference Range:  Vitamin D status         Range   Deficiency              <20 ng/mL   Mild Deficiency       20-30 ng/mL   Sufficiency           ng/mL   Toxicity               >100 ng/mL      WBC 06/20/2021 5.5  3.5 - 11.0 k/uL Final    RBC 06/20/2021 3.04* 4.0 - 5.2 m/uL Final    Hemoglobin 06/20/2021 9.5* 12.0 - 16.0 g/dL Final    Hematocrit 06/20/2021 28.8* 36 - 46 % Final    MCV 06/20/2021 94.8  80 - 100 fL Final    MCH 06/20/2021 31.1  26 - 34 pg Final    MCHC 06/20/2021 32.8  31 - 37 g/dL Final    RDW 06/20/2021 13.4  11.5 - 14.9 % Final    Platelets 73/31/4495 205  150 - 450 k/uL Final    MPV 06/20/2021 8.4  6.0 - 12.0 fL Final    NRBC Automated 06/20/2021 NOT REPORTED  per 100 WBC Final    Differential Type 06/20/2021 NOT REPORTED   Final  Seg Neutrophils 06/20/2021 46  36 - 66 % Final    Lymphocytes 06/20/2021 36  24 - 44 % Final    Monocytes 06/20/2021 11* 1 - 7 % Final    Eosinophils % 06/20/2021 7* 0 - 4 % Final    Basophils 06/20/2021 0  0 - 2 % Final    Immature Granulocytes 06/20/2021 NOT REPORTED  0 % Final    Segs Absolute 06/20/2021 2.50  1.3 - 9.1 k/uL Final    Absolute Lymph # 06/20/2021 2.00  1.0 - 4.8 k/uL Final    Absolute Mono # 06/20/2021 0.60  0.1 - 1.3 k/uL Final    Absolute Eos # 06/20/2021 0.40  0.0 - 0.4 k/uL Final    Basophils Absolute 06/20/2021 0.00  0.0 - 0.2 k/uL Final    Absolute Immature Granulocyte 06/20/2021 NOT REPORTED  0.00 - 0.30 k/uL Final    WBC Morphology 06/20/2021 NOT REPORTED   Final    RBC Morphology 06/20/2021 NOT REPORTED   Final    Platelet Estimate 20/74/6331 NOT REPORTED   Final    WBC 06/21/2021 5.7  3.5 - 11.0 k/uL Final    RBC 06/21/2021 3.00* 4.0 - 5.2 m/uL Final    Hemoglobin 06/21/2021 9.3* 12.0 - 16.0 g/dL Final    Hematocrit 06/21/2021 28.8* 36 - 46 % Final    MCV 06/21/2021 95.9  80 - 100 fL Final    MCH 06/21/2021 31.2  26 - 34 pg Final    MCHC 06/21/2021 32.5  31 - 37 g/dL Final    RDW 06/21/2021 13.7  11.5 - 14.9 % Final    Platelets 80/49/1437 198  150 - 450 k/uL Final    MPV 06/21/2021 8.1  6.0 - 12.0 fL Final    NRBC Automated 06/21/2021 NOT REPORTED  per 100 WBC Final    Differential Type 06/21/2021 NOT REPORTED   Final    Seg Neutrophils 06/21/2021 42  36 - 66 % Final    Lymphocytes 06/21/2021 38  24 - 44 % Final    Monocytes 06/21/2021 11* 1 - 7 % Final    Eosinophils % 06/21/2021 8* 0 - 4 % Final    Basophils 06/21/2021 1  0 - 2 % Final    Immature Granulocytes 06/21/2021 NOT REPORTED  0 % Final    Segs Absolute 06/21/2021 2.40  1.3 - 9.1 k/uL Final    Absolute Lymph # 06/21/2021 2.20  1.0 - 4.8 k/uL Final    Absolute Mono # 06/21/2021 0.60  0.1 - 1.3 k/uL Final    Absolute Eos # 06/21/2021 0.40  0.0 - 0.4 k/uL Final    Basophils Absolute 06/21/2021 0.00  0.0 - 0.2 k/uL Final    Absolute Immature Granulocyte 06/21/2021 NOT REPORTED  0.00 - 0.30 k/uL Final    WBC Morphology 06/21/2021 NOT REPORTED   Final    RBC Morphology 06/21/2021 NOT REPORTED   Final    Platelet Estimate 97/15/7552 NOT REPORTED   Final    WBC 06/22/2021 6.2  3.5 - 11.0 k/uL Final    RBC 06/22/2021 3.33* 4.0 - 5.2 m/uL Final    Hemoglobin 06/22/2021 10.5* 12.0 - 16.0 g/dL Final    Hematocrit 06/22/2021 31.8* 36 - 46 % Final    MCV 06/22/2021 95.6  80 - 100 fL Final    MCH 06/22/2021 31.5  26 - 34 pg Final    MCHC 06/22/2021 32.9  31 - 37 g/dL Final    RDW 06/22/2021 13.3  11.5 - 14.9 % Final    Platelets 52/81/5482 223  150 - 450 k/uL Final    MPV 06/22/2021 8.3  6.0 - 12.0 fL Final    NRBC Automated 06/22/2021 NOT REPORTED  per 100 WBC Final    Differential Type 06/22/2021 NOT REPORTED   Final    Seg Neutrophils 06/22/2021 49  36 - 66 % Final    Lymphocytes 06/22/2021 35  24 - 44 % Final    Monocytes 06/22/2021 9* 1 - 7 % Final    Eosinophils % 06/22/2021 6* 0 - 4 % Final    Basophils 06/22/2021 1  0 - 2 % Final    Immature Granulocytes 06/22/2021 NOT REPORTED  0 % Final    Segs Absolute 06/22/2021 3.00  1.3 - 9.1 k/uL Final    Absolute Lymph # 06/22/2021 2.10  1.0 - 4.8 k/uL Final    Absolute Mono # 06/22/2021 0.60  0.1 - 1.3 k/uL Final    Absolute Eos # 06/22/2021 0.40  0.0 - 0.4 k/uL Final    Basophils Absolute 06/22/2021 0.00  0.0 - 0.2 k/uL Final    Absolute Immature Granulocyte 06/22/2021 NOT REPORTED  0.00 - 0.30 k/uL Final    WBC Morphology 06/22/2021 NOT REPORTED   Final    RBC Morphology 06/22/2021 NOT REPORTED   Final    Platelet Estimate 71/06/2522 NOT REPORTED   Final         Reviewed patient's current plan of care and vital signs with nursing staff.     Labs reviewed: [x] Yes    Medications  Current Facility-Administered Medications: ciprofloxacin (CILOXAN) 0.3 % ophthalmic solution 2 drop, 2 drop, Right Eye, Q4H While awake  acetaminophen (TYLENOL) tablet 650 mg, 650 mg, Oral, Q4H PRN  ibuprofen (ADVIL;MOTRIN) tablet 400 mg, 400 mg, Oral, Q6H PRN  polyethylene glycol (GLYCOLAX) packet 17 g, 17 g, Oral, Daily PRN  aluminum & magnesium hydroxide-simethicone (MAALOX) 200-200-20 MG/5ML suspension 30 mL, 30 mL, Oral, Q6H PRN  hydrOXYzine (ATARAX) tablet 50 mg, 50 mg, Oral, TID PRN  traZODone (DESYREL) tablet 50 mg, 50 mg, Oral, Nightly PRN  allopurinol (ZYLOPRIM) tablet 100 mg, 100 mg, Oral, Daily  vitamin D3 (CHOLECALCIFEROL) tablet 5,000 Units, 1 tablet, Oral, Daily  OLANZapine (ZYPREXA) tablet 2.5 mg, 2.5 mg, Oral, Nightly    ASSESSMENT  Acute psychosis ([facility] [address]         PLAN  Patient symptoms are: Minimally improved  Continue current medication regimen. Monitor need and frequency of PRN medications. Internal medicine to continue following for medical management  Continue line of sight for fall precautions  Encourage participation in groups and milieu. Attempt to develop insight. Psycho-education conducted. Supportive Therapy conducted. Probable discharge is per attending physician, PASSR complete and pending. Adult Protective Services will be notified by social work  Follow-up daily while inpatient. Patient continues to be monitored in the inpatient psychiatric facility at [facility] for safety and stabilization. Patient continues to need, on a daily basis, active treatment furnished directly by or requiring the supervision of inpatient psychiatric personnel. Electronically signed by PEREZ Limon CNP on 6/22/2021 at 3:06 PM    **This report has been created using voice recognition software. It may contain minor errors which are inherent in voice recognition technology. **  I independently saw and evaluated the patient. I reviewed the midlevel provider's documentation above. Any additional comments or changes to the midlevel provider's documentation are stated below otherwise agree with assessment.       The patient is discharged focused. She wants me to call her son to pick her up. She wants to know when she can go home. The patient is difficult to interview because she is hard of hearing and is unable to read. The patient is unable to give any account of the circumstances leading to her admission. She has no insight into her illness. Have noted that the patient has been compliant with her Zyprexa 2.5 mg at nighttime. She denies any side effects from this medication. PLAN  Medications as noted above  Attempt to develop insight  Psycho-education conducted. Supportive Therapy conducted.   Probable discharge is in 1-2 days  Follow-up daily while on inpatient unit    Electronically signed by Kris Carpio MD on 6/22/21 at 8:33 PM EDT

## 2021-06-23 LAB
ABSOLUTE EOS #: 0.4 K/UL (ref 0–0.4)
ABSOLUTE IMMATURE GRANULOCYTE: ABNORMAL K/UL (ref 0–0.3)
ABSOLUTE LYMPH #: 1.7 K/UL (ref 1–4.8)
ABSOLUTE MONO #: 0.5 K/UL (ref 0.1–1.3)
BASOPHILS # BLD: 1 % (ref 0–2)
BASOPHILS ABSOLUTE: 0 K/UL (ref 0–0.2)
DIFFERENTIAL TYPE: ABNORMAL
EOSINOPHILS RELATIVE PERCENT: 7 % (ref 0–4)
HCT VFR BLD CALC: 31.7 % (ref 36–46)
HEMOGLOBIN: 10.4 G/DL (ref 12–16)
IMMATURE GRANULOCYTES: ABNORMAL %
LYMPHOCYTES # BLD: 30 % (ref 24–44)
MCH RBC QN AUTO: 31.8 PG (ref 26–34)
MCHC RBC AUTO-ENTMCNC: 32.8 G/DL (ref 31–37)
MCV RBC AUTO: 96.9 FL (ref 80–100)
MONOCYTES # BLD: 8 % (ref 1–7)
NRBC AUTOMATED: ABNORMAL PER 100 WBC
PDW BLD-RTO: 13.5 % (ref 11.5–14.9)
PLATELET # BLD: 209 K/UL (ref 150–450)
PLATELET ESTIMATE: ABNORMAL
PMV BLD AUTO: 8.1 FL (ref 6–12)
RBC # BLD: 3.27 M/UL (ref 4–5.2)
RBC # BLD: ABNORMAL 10*6/UL
SEG NEUTROPHILS: 54 % (ref 36–66)
SEGMENTED NEUTROPHILS ABSOLUTE COUNT: 3 K/UL (ref 1.3–9.1)
WBC # BLD: 5.6 K/UL (ref 3.5–11)
WBC # BLD: ABNORMAL 10*3/UL

## 2021-06-23 PROCEDURE — 36415 COLL VENOUS BLD VENIPUNCTURE: CPT

## 2021-06-23 PROCEDURE — 6370000000 HC RX 637 (ALT 250 FOR IP): Performed by: PSYCHIATRY & NEUROLOGY

## 2021-06-23 PROCEDURE — 6370000000 HC RX 637 (ALT 250 FOR IP)

## 2021-06-23 PROCEDURE — APPSS15 APP SPLIT SHARED TIME 0-15 MINUTES: Performed by: NURSE PRACTITIONER

## 2021-06-23 PROCEDURE — 99232 SBSQ HOSP IP/OBS MODERATE 35: CPT | Performed by: PSYCHIATRY & NEUROLOGY

## 2021-06-23 PROCEDURE — 1240000000 HC EMOTIONAL WELLNESS R&B

## 2021-06-23 PROCEDURE — 85025 COMPLETE CBC W/AUTO DIFF WBC: CPT

## 2021-06-23 RX ADMIN — IBUPROFEN 400 MG: 400 TABLET, FILM COATED ORAL at 19:27

## 2021-06-23 RX ADMIN — CIPROFLOXACIN 2 DROP: 3 SOLUTION OPHTHALMIC at 18:23

## 2021-06-23 RX ADMIN — CIPROFLOXACIN 2 DROP: 3 SOLUTION OPHTHALMIC at 09:04

## 2021-06-23 RX ADMIN — TRAZODONE HYDROCHLORIDE 50 MG: 50 TABLET ORAL at 19:27

## 2021-06-23 RX ADMIN — ALLOPURINOL 100 MG: 100 TABLET ORAL at 09:03

## 2021-06-23 RX ADMIN — HYDROXYZINE HYDROCHLORIDE 50 MG: 50 TABLET, FILM COATED ORAL at 19:27

## 2021-06-23 RX ADMIN — CIPROFLOXACIN 2 DROP: 3 SOLUTION OPHTHALMIC at 14:52

## 2021-06-23 RX ADMIN — CIPROFLOXACIN 2 DROP: 3 SOLUTION OPHTHALMIC at 21:13

## 2021-06-23 RX ADMIN — OLANZAPINE 2.5 MG: 5 TABLET, FILM COATED ORAL at 19:27

## 2021-06-23 RX ADMIN — Medication 5000 UNITS: at 09:03

## 2021-06-23 RX ADMIN — HYDROXYZINE HYDROCHLORIDE 50 MG: 50 TABLET, FILM COATED ORAL at 02:00

## 2021-06-23 ASSESSMENT — PAIN SCALES - GENERAL: PAINLEVEL_OUTOF10: 4

## 2021-06-23 NOTE — BH NOTE
DISCHARGE PLANNING UPDATE:  - Writer speaks with client's daughter, Amada Taylor at [phone] and she recently moved back from [address] [place] [address] on Monday. She is very concerned about her mother. She states her mother has lived in [place] terrible environment for along time\" and it has gotten worse over the past few years. She states she has not gone to their home in over 5-years because of the hoarding, bed bugs and other \"dirty issues in the home\". - She states when her mother was in [facility] she was very happy, didn't want to return home and she was able to identify that she could not get around in the home. This was 3-years ago. - Daughter confirms the client's language of choice [place] and secondary is [place]arin.  - Writer speaks with daughter about making sure her father gets client signed up for Medicaid which will be needed prior ECF placement. - Writer provides Ms. Amada Taylor direct number and private voicemail if needs to speak with SW.

## 2021-06-23 NOTE — PLAN OF CARE
Problem: Altered Mood, Deterioration in Function:  Goal: Able to verbalize reality based thinking  Description: Able to verbalize reality based thinking  Outcome: Ongoing  Note: Pt appears confused at times. Pt oriented to person and place. Pt focused on getting her hearing aides and seeing a doctor for her vision. Pt states her  visited today and it did not go well but wouldn't elaborate further. Pt states she was told she is going to a nursing home but does not want to go. Problem: Altered Mood, Psychotic Behavior:  Goal: Able to verbalize decrease in frequency and intensity of hallucinations  Description: Able to verbalize decrease in frequency and intensity of hallucinations  6/22/2021 2335 by Artemio Berrios RN  Outcome: Ongoing  Note: Pt denies experiencing auditory, visual, and tactile hallucinations at this time. Problem: Falls - Risk of:  Goal: Will remain free from falls  Description: Will remain free from falls  Outcome: Ongoing  Note: Pt remains free of falls and verbalizes understanding of individual fall risks. Pt wearing non skid footwear and encouraged to seek out staff for any assistance needed. Pt gets around using a wheelchair with staff assist. Pt is a x1 assist when ambulating. Problem: Falls - Risk of:  Goal: Absence of physical injury  Description: Absence of physical injury  6/22/2021 2335 by Artemio Berrios RN  Outcome: Ongoing  Note: Patient remains free from physical injury at this time. Will continue to monitor.

## 2021-06-23 NOTE — GROUP NOTE
Group Therapy Note    Date: 6/23/2021    Group Start Time: 2682  Group End Time: 1100  Group Topic: Psychotherapy    LUNA Banerjee        Group Therapy Note           Patient refused to attend psychotherapy group after encouragement from staff. 1:1 talk time offered but refused. Signature:   Kaitlynn Banerjee

## 2021-06-23 NOTE — PROGRESS NOTES
Daily Progress Note  6/23/2021    Patient Name: Cresencio Anderson    CHIEF COMPLAINT: Acute psychosis         SUBJECTIVE:      Patient is seen today for a follow up assessment. Josue Bauman is sitting in the common area in a wheelchair, she requested a snack from her nurse. Staff reports no overnight events, she is medication compliant and has been eating pretty well. She is extremely hard of hearing, speaks loudly and at times requires the ability to lip read. She reports that she is worried because she is disabled and is afraid to return to her home. She reports that there is no water or electricity and she is unable to shower or do laundry. She reports that her  does not clean the house and help her even though she is \"disabled\". She reports concern that her daughter lives too far away to provide assistance, she reports that her daughter lives in the [facility]. We discussed the potential to discharge to a nursing home, she is initially reluctant though agrees that it would be the safest placement for her. She has significant deficits with regards to ADL care. Social work reports that they spoke with her daughter who confirms that her primary language is [place]. Social work also discussed with her daughter the benefits from obtaining a guardian outside of the family due to conflicts within the family itself.     Appetite:  [] Adequate/Unchanged  [] Increased  [x] Decreased, improving    Sleep:       [x] Adequate/Unchanged  [x] Fair  [] Poor      Group Attendance on Unit:   [] Yes  [] Selectively    [x] No    Medication Side Effects: Denies         Mental Status Exam  Level of consciousness: Awake and alert  Appearance: Personal attire, sitting in wheelchair, with fair  grooming and hygiene   Behavior/Motor: Approachable, continues to require assistance with ADLs, unable to ambulate per self secondary to unsteady gait and fall risk  Attitude toward examiner: Cooperative, attentive, intense eye contact  Speech: Loud, latent (hard of hearing)  Mood: Anxious  Affect: Mood congruent  Thought processes: Mostly linear and coherent, tangential statements are redirectable   thought content: Ruminates on safety and returning to her home, Denies homicidal ideation  Suicidal Ideation: Denies suicidal ideations, contracts for safety on the unit. Delusions: Improved, less bizarre  Perceptual Disturbance: Does not appear to attend to internal stimuli  Cognition: Oriented to self, hospital and somewhat to general circumstance  Memory: impaired   Insight: poor   Judgement: poor       Data   oral temperature is 97.4 °F (36.3 °C). Her blood pressure is 114/86 and her pulse is 77. Her respiration is 14. Labs:   Admission on 06/18/2021   Component Date Value Ref Range Status    POC Glucose 06/18/2021 94  65 - 105 mg/dL Final    Hemoglobin A1C 06/18/2021 5.6  4.0 - 6.0 % Final    Estimated Avg Glucose 06/18/2021 114  mg/dL Final    Comment: The ADA and AACC recommend providing the estimated average glucose result to permit better   patient understanding of their HBA1c result.       WBC 06/18/2021 6.5  3.5 - 11.0 k/uL Final    RBC 06/18/2021 3.85* 4.0 - 5.2 m/uL Final    Hemoglobin 06/18/2021 12.4  12.0 - 16.0 g/dL Final    Hematocrit 06/18/2021 36.8  36 - 46 % Final    MCV 06/18/2021 95.6  80 - 100 fL Final    MCH 06/18/2021 32.2  26 - 34 pg Final    MCHC 06/18/2021 33.7  31 - 37 g/dL Final    RDW 06/18/2021 13.2  11.5 - 14.9 % Final    Platelets 50/77/2220 255  150 - 450 k/uL Final    MPV 06/18/2021 8.3  6.0 - 12.0 fL Final    NRBC Automated 06/18/2021 NOT REPORTED  per 100 WBC Final    Differential Type 06/18/2021 NOT REPORTED   Final    Seg Neutrophils 06/18/2021 50  36 - 66 % Final    Lymphocytes 06/18/2021 33  24 - 44 % Final    Monocytes 06/18/2021 9* 1 - 7 % Final    Eosinophils % 06/18/2021 7* 0 - 4 % Final    Basophils 06/18/2021 1  0 - 2 % Final    Immature Granulocytes 06/18/2021 NOT REPORTED  0 % Final    Segs Absolute 06/18/2021 3.20  1.3 - 9.1 k/uL Final    Absolute Lymph # 06/18/2021 2.10  1.0 - 4.8 k/uL Final    Absolute Mono # 06/18/2021 0.60  0.1 - 1.3 k/uL Final    Absolute Eos # 06/18/2021 0.50* 0.0 - 0.4 k/uL Final    Basophils Absolute 06/18/2021 0.10  0.0 - 0.2 k/uL Final    Absolute Immature Granulocyte 06/18/2021 NOT REPORTED  0.00 - 0.30 k/uL Final    WBC Morphology 06/18/2021 NOT REPORTED   Final    RBC Morphology 06/18/2021 NOT REPORTED   Final    Platelet Estimate 49/33/4937 NOT REPORTED   Final    Glucose 06/18/2021 95  70 - 99 mg/dL Final    BUN 06/18/2021 13  8 - 23 mg/dL Final    CREATININE 06/18/2021 0.94* 0.50 - 0.90 mg/dL Final    Bun/Cre Ratio 06/18/2021 NOT REPORTED  9 - 20 Final    Calcium 06/18/2021 9.4  8.6 - 10.4 mg/dL Final    Sodium 06/18/2021 143  135 - 144 mmol/L Final    Potassium 06/18/2021 4.7  3.7 - 5.3 mmol/L Final    Chloride 06/18/2021 109* 98 - 107 mmol/L Final    CO2 06/18/2021 22  20 - 31 mmol/L Final    Anion Gap 06/18/2021 12  9 - 17 mmol/L Final    Alkaline Phosphatase 06/18/2021 74  35 - 104 U/L Final    ALT 06/18/2021 <5* 5 - 33 U/L Final    AST 06/18/2021 14  <32 U/L Final    Total Bilirubin 06/18/2021 0.38  0.3 - 1.2 mg/dL Final    Total Protein 06/18/2021 6.9  6.4 - 8.3 g/dL Final    Albumin 06/18/2021 4.1  3.5 - 5.2 g/dL Final    Albumin/Globulin Ratio 06/18/2021 NOT REPORTED  1.0 - 2.5 Final    GFR Non- 06/18/2021 58* >60 mL/min Final    GFR  06/18/2021 >60  >60 mL/min Final    GFR Comment 06/18/2021        Final    Comment: Average GFR for 79or more years old:   76 mL/min/1.73sq m  Chronic Kidney Disease:   <60 mL/min/1.73sq m  Kidney failure:   <15 mL/min/1.73sq m              eGFR calculated using average adult body mass.  Additional eGFR calculator available at:        [employer]            GFR Staging 06/18/2021 NOT REPORTED   Final    TSH 06/18/2021 4.25  0.30 - 5.00 mIU/L Final    POC Glucose 06/18/2021 125* 65 - 105 mg/dL Final    WBC 06/19/2021 5.9  3.5 - 11.0 k/uL Final    RBC 06/19/2021 3.27* 4.0 - 5.2 m/uL Final    Hemoglobin 06/19/2021 10.4* 12.0 - 16.0 g/dL Final    Hematocrit 06/19/2021 31.6* 36 - 46 % Final    MCV 06/19/2021 96.7  80 - 100 fL Final    MCH 06/19/2021 31.8  26 - 34 pg Final    MCHC 06/19/2021 32.9  31 - 37 g/dL Final    RDW 06/19/2021 13.7  11.5 - 14.9 % Final    Platelets 71/56/3040 227  150 - 450 k/uL Final    MPV 06/19/2021 7.8  6.0 - 12.0 fL Final    NRBC Automated 06/19/2021 NOT REPORTED  per 100 WBC Final    Differential Type 06/19/2021 NOT REPORTED   Final    Seg Neutrophils 06/19/2021 52  36 - 66 % Final    Lymphocytes 06/19/2021 31  24 - 44 % Final    Monocytes 06/19/2021 10* 1 - 7 % Final    Eosinophils % 06/19/2021 6* 0 - 4 % Final    Basophils 06/19/2021 1  0 - 2 % Final    Immature Granulocytes 06/19/2021 NOT REPORTED  0 % Final    Segs Absolute 06/19/2021 3.00  1.3 - 9.1 k/uL Final    Absolute Lymph # 06/19/2021 1.80  1.0 - 4.8 k/uL Final    Absolute Mono # 06/19/2021 0.60  0.1 - 1.3 k/uL Final    Absolute Eos # 06/19/2021 0.30  0.0 - 0.4 k/uL Final    Basophils Absolute 06/19/2021 0.10  0.0 - 0.2 k/uL Final    Absolute Immature Granulocyte 06/19/2021 NOT REPORTED  0.00 - 0.30 k/uL Final    WBC Morphology 06/19/2021 NOT REPORTED   Final    RBC Morphology 06/19/2021 NOT REPORTED   Final    Platelet Estimate 19/43/9236 NOT REPORTED   Final    Vit D, 25-Hydroxy 06/19/2021 31.0  30.0 - 100.0 ng/mL Final    Comment:    Reference Range:  Vitamin D status         Range   Deficiency              <20 ng/mL   Mild Deficiency       20-30 ng/mL   Sufficiency           ng/mL   Toxicity               >100 ng/mL      WBC 06/20/2021 5.5  3.5 - 11.0 k/uL Final    RBC 06/20/2021 3.04* 4.0 - 5.2 m/uL Final    Hemoglobin 06/20/2021 9.5* 12.0 - 16.0 g/dL Final    Hematocrit 06/20/2021 28.8* 36 - 46 % Final    MCV 06/20/2021 94.8  80 - 100 fL Final    MCH 06/20/2021 31.1  26 - 34 pg Final    MCHC 06/20/2021 32.8  31 - 37 g/dL Final    RDW 06/20/2021 13.4  11.5 - 14.9 % Final    Platelets 06/35/2825 205  150 - 450 k/uL Final    MPV 06/20/2021 8.4  6.0 - 12.0 fL Final    NRBC Automated 06/20/2021 NOT REPORTED  per 100 WBC Final    Differential Type 06/20/2021 NOT REPORTED   Final    Seg Neutrophils 06/20/2021 46  36 - 66 % Final    Lymphocytes 06/20/2021 36  24 - 44 % Final    Monocytes 06/20/2021 11* 1 - 7 % Final    Eosinophils % 06/20/2021 7* 0 - 4 % Final    Basophils 06/20/2021 0  0 - 2 % Final    Immature Granulocytes 06/20/2021 NOT REPORTED  0 % Final    Segs Absolute 06/20/2021 2.50  1.3 - 9.1 k/uL Final    Absolute Lymph # 06/20/2021 2.00  1.0 - 4.8 k/uL Final    Absolute Mono # 06/20/2021 0.60  0.1 - 1.3 k/uL Final    Absolute Eos # 06/20/2021 0.40  0.0 - 0.4 k/uL Final    Basophils Absolute 06/20/2021 0.00  0.0 - 0.2 k/uL Final    Absolute Immature Granulocyte 06/20/2021 NOT REPORTED  0.00 - 0.30 k/uL Final    WBC Morphology 06/20/2021 NOT REPORTED   Final    RBC Morphology 06/20/2021 NOT REPORTED   Final    Platelet Estimate 75/60/2013 NOT REPORTED   Final    WBC 06/21/2021 5.7  3.5 - 11.0 k/uL Final    RBC 06/21/2021 3.00* 4.0 - 5.2 m/uL Final    Hemoglobin 06/21/2021 9.3* 12.0 - 16.0 g/dL Final    Hematocrit 06/21/2021 28.8* 36 - 46 % Final    MCV 06/21/2021 95.9  80 - 100 fL Final    MCH 06/21/2021 31.2  26 - 34 pg Final    MCHC 06/21/2021 32.5  31 - 37 g/dL Final    RDW 06/21/2021 13.7  11.5 - 14.9 % Final    Platelets 47/78/0169 198  150 - 450 k/uL Final    MPV 06/21/2021 8.1  6.0 - 12.0 fL Final    NRBC Automated 06/21/2021 NOT REPORTED  per 100 WBC Final    Differential Type 06/21/2021 NOT REPORTED   Final    Seg Neutrophils 06/21/2021 42  36 - 66 % Final    Lymphocytes 06/21/2021 38  24 - 44 % Final    Monocytes 06/21/2021 11* 1 - 7 % Final    Eosinophils % 06/21/2021 8* 0 - 4 % Final    Basophils 06/21/2021 1  0 - 2 % Final    Immature Granulocytes 06/21/2021 NOT REPORTED  0 % Final    Segs Absolute 06/21/2021 2.40  1.3 - 9.1 k/uL Final    Absolute Lymph # 06/21/2021 2.20  1.0 - 4.8 k/uL Final    Absolute Mono # 06/21/2021 0.60  0.1 - 1.3 k/uL Final    Absolute Eos # 06/21/2021 0.40  0.0 - 0.4 k/uL Final    Basophils Absolute 06/21/2021 0.00  0.0 - 0.2 k/uL Final    Absolute Immature Granulocyte 06/21/2021 NOT REPORTED  0.00 - 0.30 k/uL Final    WBC Morphology 06/21/2021 NOT REPORTED   Final    RBC Morphology 06/21/2021 NOT REPORTED   Final    Platelet Estimate 32/65/1079 NOT REPORTED   Final    WBC 06/22/2021 6.2  3.5 - 11.0 k/uL Final    RBC 06/22/2021 3.33* 4.0 - 5.2 m/uL Final    Hemoglobin 06/22/2021 10.5* 12.0 - 16.0 g/dL Final    Hematocrit 06/22/2021 31.8* 36 - 46 % Final    MCV 06/22/2021 95.6  80 - 100 fL Final    MCH 06/22/2021 31.5  26 - 34 pg Final    MCHC 06/22/2021 32.9  31 - 37 g/dL Final    RDW 06/22/2021 13.3  11.5 - 14.9 % Final    Platelets 36/52/8329 223  150 - 450 k/uL Final    MPV 06/22/2021 8.3  6.0 - 12.0 fL Final    NRBC Automated 06/22/2021 NOT REPORTED  per 100 WBC Final    Differential Type 06/22/2021 NOT REPORTED   Final    Seg Neutrophils 06/22/2021 49  36 - 66 % Final    Lymphocytes 06/22/2021 35  24 - 44 % Final    Monocytes 06/22/2021 9* 1 - 7 % Final    Eosinophils % 06/22/2021 6* 0 - 4 % Final    Basophils 06/22/2021 1  0 - 2 % Final    Immature Granulocytes 06/22/2021 NOT REPORTED  0 % Final    Segs Absolute 06/22/2021 3.00  1.3 - 9.1 k/uL Final    Absolute Lymph # 06/22/2021 2.10  1.0 - 4.8 k/uL Final    Absolute Mono # 06/22/2021 0.60  0.1 - 1.3 k/uL Final    Absolute Eos # 06/22/2021 0.40  0.0 - 0.4 k/uL Final    Basophils Absolute 06/22/2021 0.00  0.0 - 0.2 k/uL Final    Absolute Immature Granulocyte 06/22/2021 NOT REPORTED  0.00 - 0.30 k/uL Final    WBC Morphology 06/22/2021 NOT REPORTED   Final    RBC Morphology 06/22/2021 NOT REPORTED   Final    Platelet Estimate 37/32/1774 NOT REPORTED   Final    WBC 06/23/2021 5.6  3.5 - 11.0 k/uL Final    RBC 06/23/2021 3.27* 4.0 - 5.2 m/uL Final    Hemoglobin 06/23/2021 10.4* 12.0 - 16.0 g/dL Final    Hematocrit 06/23/2021 31.7* 36 - 46 % Final    MCV 06/23/2021 96.9  80 - 100 fL Final    MCH 06/23/2021 31.8  26 - 34 pg Final    MCHC 06/23/2021 32.8  31 - 37 g/dL Final    RDW 06/23/2021 13.5  11.5 - 14.9 % Final    Platelets 78/01/2429 209  150 - 450 k/uL Final    MPV 06/23/2021 8.1  6.0 - 12.0 fL Final    NRBC Automated 06/23/2021 NOT REPORTED  per 100 WBC Final    Differential Type 06/23/2021 NOT REPORTED   Final    Seg Neutrophils 06/23/2021 54  36 - 66 % Final    Lymphocytes 06/23/2021 30  24 - 44 % Final    Monocytes 06/23/2021 8* 1 - 7 % Final    Eosinophils % 06/23/2021 7* 0 - 4 % Final    Basophils 06/23/2021 1  0 - 2 % Final    Immature Granulocytes 06/23/2021 NOT REPORTED  0 % Final    Segs Absolute 06/23/2021 3.00  1.3 - 9.1 k/uL Final    Absolute Lymph # 06/23/2021 1.70  1.0 - 4.8 k/uL Final    Absolute Mono # 06/23/2021 0.50  0.1 - 1.3 k/uL Final    Absolute Eos # 06/23/2021 0.40  0.0 - 0.4 k/uL Final    Basophils Absolute 06/23/2021 0.00  0.0 - 0.2 k/uL Final    Absolute Immature Granulocyte 06/23/2021 NOT REPORTED  0.00 - 0.30 k/uL Final    WBC Morphology 06/23/2021 NOT REPORTED   Final    RBC Morphology 06/23/2021 NOT REPORTED   Final    Platelet Estimate 79/98/4974 NOT REPORTED   Final         Reviewed patient's current plan of care and vital signs with nursing staff.     Labs reviewed: [x] Yes    Medications  Current Facility-Administered Medications: ciprofloxacin (CILOXAN) 0.3 % ophthalmic solution 2 drop, 2 drop, Right Eye, Q4H While awake  acetaminophen (TYLENOL) tablet 650 mg, 650 mg, Oral, Q4H PRN  ibuprofen (ADVIL;MOTRIN) tablet 400 mg, 400 mg, Oral, Q6H PRN  polyethylene glycol (GLYCOLAX) packet 17 g, 17 g, Oral, Daily PRN  aluminum & magnesium hydroxide-simethicone (MAALOX) 200-200-20 MG/5ML suspension 30 mL, 30 mL, Oral, Q6H PRN  hydrOXYzine (ATARAX) tablet 50 mg, 50 mg, Oral, TID PRN  traZODone (DESYREL) tablet 50 mg, 50 mg, Oral, Nightly PRN  allopurinol (ZYLOPRIM) tablet 100 mg, 100 mg, Oral, Daily  vitamin D3 (CHOLECALCIFEROL) tablet 5,000 Units, 1 tablet, Oral, Daily  OLANZapine (ZYPREXA) tablet 2.5 mg, 2.5 mg, Oral, Nightly    ASSESSMENT  Acute psychosis ([facility] [address]         PLAN  Patient symptoms are: Modest improvement  Continue current medication regimen. Monitor need and frequency of PRN medications. Internal medicine to continue following for medical management  Continue line of sight for fall precautions  Encourage participation in groups and milieu. Attempt to develop insight. Psycho-education conducted. Supportive Therapy conducted. Probable discharge is per attending physician, PASSR complete and pending. Guardianship would be beneficial.  Adult Protective Services will be notified by social work  Follow-up daily while inpatient. Patient continues to be monitored in the inpatient psychiatric facility at [facility] for safety and stabilization. Patient continues to need, on a daily basis, active treatment furnished directly by or requiring the supervision of inpatient psychiatric personnel. Electronically signed by PEREZ Stanley CNP on 6/23/2021 at 5:32 PM    **This report has been created using voice recognition software. It may contain minor errors which are inherent in voice recognition technology. **    I independently saw and evaluated the patient. I reviewed the midlevel provider's documentation above. Any additional comments or changes to the midlevel provider's documentation are stated below otherwise agree with assessment. The patient remains discharge focused. She denies all symptoms. She speaks loudly because she is hard of hearing.   The patient is unable to understand written communication. The patient is also complaining about food. She states she normally only eats [place] food at home. PLAN  Medications as noted above  Attempt to develop insight  Psycho-education conducted. Supportive Therapy conducted.   Probable discharge is 3 to 5 days  Follow-up daily while on inpatient unit    Electronically signed by Candy Kent MD on 6/23/21 at 6:03 PM EDT

## 2021-06-23 NOTE — PLAN OF CARE
Problem: Falls - Risk of:  Goal: Will remain free from falls  Description: Will remain free from falls  6/23/2021 1216 by Jami Zuluaga RN  Outcome: Ongoing  Note: Patient remains free from self harm or falls. Medication compliant and behavior controlled. Focused on food and having improvements made in her home. Assist to bathroom and wheelchair.

## 2021-06-23 NOTE — GROUP NOTE
Group Therapy Note    Date: 6/23/2021    Group Start Time: 1100  Group End Time: 1140  Group Topic: Cognitive Skills    LUNA BHSHANNON Juares, CTRS        Group Therapy Note    Attendees: 10/16         Pt did not participate in Cognitive Skills Group at 1100am when encouraged by RT due to resting in room. Pt was offered talk time as an alternative to group but declined.        Discipline Responsible: Psychoeducational Specialist      Signature:  Fracisco Price

## 2021-06-24 LAB
ABSOLUTE EOS #: 0.4 K/UL (ref 0–0.4)
ABSOLUTE IMMATURE GRANULOCYTE: ABNORMAL K/UL (ref 0–0.3)
ABSOLUTE LYMPH #: 1.7 K/UL (ref 1–4.8)
ABSOLUTE MONO #: 0.5 K/UL (ref 0.1–1.3)
BASOPHILS # BLD: 1 % (ref 0–2)
BASOPHILS ABSOLUTE: 0.1 K/UL (ref 0–0.2)
DIFFERENTIAL TYPE: ABNORMAL
EOSINOPHILS RELATIVE PERCENT: 6 % (ref 0–4)
HCT VFR BLD CALC: 32.3 % (ref 36–46)
HEMOGLOBIN: 10.6 G/DL (ref 12–16)
IMMATURE GRANULOCYTES: ABNORMAL %
LYMPHOCYTES # BLD: 30 % (ref 24–44)
MCH RBC QN AUTO: 31.9 PG (ref 26–34)
MCHC RBC AUTO-ENTMCNC: 32.8 G/DL (ref 31–37)
MCV RBC AUTO: 97.4 FL (ref 80–100)
MONOCYTES # BLD: 9 % (ref 1–7)
NRBC AUTOMATED: ABNORMAL PER 100 WBC
PDW BLD-RTO: 13.4 % (ref 11.5–14.9)
PLATELET # BLD: 219 K/UL (ref 150–450)
PLATELET ESTIMATE: ABNORMAL
PMV BLD AUTO: 7.9 FL (ref 6–12)
RBC # BLD: 3.31 M/UL (ref 4–5.2)
RBC # BLD: ABNORMAL 10*6/UL
SEG NEUTROPHILS: 54 % (ref 36–66)
SEGMENTED NEUTROPHILS ABSOLUTE COUNT: 3.1 K/UL (ref 1.3–9.1)
WBC # BLD: 5.8 K/UL (ref 3.5–11)
WBC # BLD: ABNORMAL 10*3/UL

## 2021-06-24 PROCEDURE — 36415 COLL VENOUS BLD VENIPUNCTURE: CPT

## 2021-06-24 PROCEDURE — 99232 SBSQ HOSP IP/OBS MODERATE 35: CPT | Performed by: PSYCHIATRY & NEUROLOGY

## 2021-06-24 PROCEDURE — 1240000000 HC EMOTIONAL WELLNESS R&B

## 2021-06-24 PROCEDURE — 85025 COMPLETE CBC W/AUTO DIFF WBC: CPT

## 2021-06-24 PROCEDURE — 6370000000 HC RX 637 (ALT 250 FOR IP)

## 2021-06-24 PROCEDURE — 6370000000 HC RX 637 (ALT 250 FOR IP): Performed by: PSYCHIATRY & NEUROLOGY

## 2021-06-24 RX ADMIN — Medication 5000 UNITS: at 08:25

## 2021-06-24 RX ADMIN — OLANZAPINE 2.5 MG: 5 TABLET, FILM COATED ORAL at 21:08

## 2021-06-24 RX ADMIN — HYDROXYZINE HYDROCHLORIDE 50 MG: 50 TABLET, FILM COATED ORAL at 21:08

## 2021-06-24 RX ADMIN — CIPROFLOXACIN 2 DROP: 3 SOLUTION OPHTHALMIC at 06:00

## 2021-06-24 RX ADMIN — CIPROFLOXACIN 2 DROP: 3 SOLUTION OPHTHALMIC at 08:25

## 2021-06-24 RX ADMIN — ALLOPURINOL 100 MG: 100 TABLET ORAL at 08:25

## 2021-06-24 RX ADMIN — CIPROFLOXACIN 2 DROP: 3 SOLUTION OPHTHALMIC at 14:30

## 2021-06-24 RX ADMIN — TRAZODONE HYDROCHLORIDE 50 MG: 50 TABLET ORAL at 21:08

## 2021-06-24 RX ADMIN — CIPROFLOXACIN 2 DROP: 3 SOLUTION OPHTHALMIC at 18:25

## 2021-06-24 NOTE — GROUP NOTE
Group Therapy Note    Date: 6/24/2021    Group Start Time: 1100  Group End Time: 3496  Group Topic: Music Therapy    LUNA Rush        Group Therapy Note    Pt did not attend music therapy group d/t resting in room despite staff invitation to attend. 1:1 talk time offered as alternative to group session, pt declined.

## 2021-06-24 NOTE — PROGRESS NOTES
Daily Progress Note  6/24/2021    Patient Name: Nicky Jean COMPLAINT: Acute psychosis         SUBJECTIVE:      The patient was seen at bedside. She appears to be in a good mood. She wanted to be raised to sit up in bed. The patient continues to be discharged focused. She continues to have limited insight into her condition. Her family has visited and brought her food. No changes have been made to the patient medications today. We are awaiting approval for nursing home placement for her discharge. Appetite:  [] Adequate/Unchanged  [] Increased  [x] Decreased, improving    Sleep:       [x] Adequate/Unchanged  [x] Fair  [] Poor      Group Attendance on Unit:   [] Yes  [] Selectively    [x] No    Medication Side Effects: Denies         Mental Status Exam  Level of consciousness: Awake and alert  Appearance: In wheelchair with fair grooming and fair hygiene  Behavior/Motor: Approachable, continues to require assistance with ADLs, unable to ambulate per self secondary to unsteady gait and fall risk  Attitude toward examiner: Cooperative, attentive, intense eye contact  Speech: Loud, increased latency  Mood: Anxious  Affect: Mood congruent  Thought processes: Mostly linear and coherent, tangential statements are redirectable   thought content: Discharge focused   Denies homicidal ideation  Suicidal Ideation: Denies suicidal ideations, contracts for safety on the unit. Delusions: Improved, less bizarre  Perceptual Disturbance: Denies   cognition: Oriented to self, hospital and somewhat to general circumstance  Memory: impaired   Insight: poor   Judgement: poor       Data   oral temperature is 98.2 °F (36.8 °C). Her blood pressure is 105/69 and her pulse is 85. Her respiration is 14. Labs:   No results displayed because visit has over 200 results. Reviewed patient's current plan of care and vital signs with nursing staff.     Labs reviewed: [x] Yes    Medications  Current Facility-Administered Medications: ciprofloxacin (CILOXAN) 0.3 % ophthalmic solution 2 drop, 2 drop, Right Eye, Q4H While awake  acetaminophen (TYLENOL) tablet 650 mg, 650 mg, Oral, Q4H PRN  ibuprofen (ADVIL;MOTRIN) tablet 400 mg, 400 mg, Oral, Q6H PRN  polyethylene glycol (GLYCOLAX) packet 17 g, 17 g, Oral, Daily PRN  aluminum & magnesium hydroxide-simethicone (MAALOX) 200-200-20 MG/5ML suspension 30 mL, 30 mL, Oral, Q6H PRN  hydrOXYzine (ATARAX) tablet 50 mg, 50 mg, Oral, TID PRN  traZODone (DESYREL) tablet 50 mg, 50 mg, Oral, Nightly PRN  allopurinol (ZYLOPRIM) tablet 100 mg, 100 mg, Oral, Daily  vitamin D3 (CHOLECALCIFEROL) tablet 5,000 Units, 1 tablet, Oral, Daily  OLANZapine (ZYPREXA) tablet 2.5 mg, 2.5 mg, Oral, Nightly    ASSESSMENT  Acute psychosis ([facility] [address]         PLAN  Patient symptoms are: Modest improvement  Continue current medication regimen. Monitor need and frequency of PRN medications. Internal medicine to continue following for medical management  Continue line of sight for fall precautions  Encourage participation in groups and milieu. Attempt to develop insight. Psycho-education conducted. Supportive Therapy conducted. Probable discharge is per attending physician, PASSR complete and pending. Guardianship would be beneficial.  Adult Protective Services will be notified by social work  Follow-up daily while inpatient. Patient continues to be monitored in the inpatient psychiatric facility at [facility] for safety and stabilization. Patient continues to need, on a daily basis, active treatment furnished directly by or requiring the supervision of inpatient psychiatric personnel.     Electronically signed by Benoit Hughes MD on 6/24/2021 at 6:38 PM

## 2021-06-24 NOTE — PROGRESS NOTES
Behavioral Services                                              Medicare Re-Certification    I certify that the inpatient psychiatric hospital services furnished since the previous certification/re-certification were, and continue to be, medically necessary for;    [x] (1) Treatment which could reasonably be expected to improve the patient's condition,    [x] (2) Or for diagnostic study. Estimated length of stay/service 1-3 days    Plan for post-hospital care -outpatient care    This patient continues to need, on a daily basis, active treatment furnished directly by or requiring the supervision of inpatient psychiatric personnel.     Electronically signed by Hubert Gregory MD on 6/24/2021 at 6:38 PM

## 2021-06-24 NOTE — GROUP NOTE
Group Therapy Note    Date: 6/24/2021    Group Start Time: 1330  Group End Time: 6721  Group Topic: Music Therapy    LUNA Rich        Group Therapy Note    Pt did not attend music therapy group d/t resting in room despite staff invitation to attend. 1:1 talk time offered as alternative to group session, pt declined.

## 2021-06-24 NOTE — PLAN OF CARE
Problem: Falls - Risk of:  Goal: Will remain free from falls  Description: Will remain free from falls  6/23/2021 2210 by Anastasiya Olsen LPN  Outcome: Ongoing     Problem: Altered Mood, Psychotic Behavior:  Goal: Able to verbalize decrease in frequency and intensity of hallucinations  Description: Able to verbalize decrease in frequency and intensity of hallucinations  Outcome: Ongoing     Patient denies all thoughts or ideations to harm self. Patient is showing no S/S of hallucinations or responding to internal stimuli. Patient is able to express needs and wants with staff members. Patient has remained free from falls and is close to nurses station for LOS fall precautions. Patient was medication and behavorial compliant. Patient provided with safe environment. Will continue to constantly monitor.

## 2021-06-24 NOTE — PLAN OF CARE
Problem: Altered Mood, Deterioration in Function:  Goal: Able to verbalize reality based thinking  Description: Able to verbalize reality based thinking  Outcome: Ongoing  Note: Patient confused at times orient to person and place,focused on hearing aide and going home. Problem: Falls - Risk of:  Goal: Will remain free from falls  Description: Will remain free from falls  6/24/2021 1010 by Edith John LPN  Outcome: Ongoing  Note: Patient has been free from falls 15 minute safety checks and fall preventions to continue for patient safety.    6/23/2021 2210 by Liane Olivo LPN  Outcome: Ongoing

## 2021-06-24 NOTE — GROUP NOTE
Group Therapy Note    Date: 6/24/2021    Group Start Time: 0900  Group End Time: 0940  Group Topic: Community Meeting    LUNA [place]        Group Therapy Note    Attendees: 7/17         Pt did not participate in Community Meeting/Goals Group at 900am when encouraged by RT due to resting in room. Pt was offered talk time as an alternative to group but declined.       Discipline Responsible: Psychoeducational Specialist      Signature:  Kadeem Vaughn

## 2021-06-24 NOTE — GROUP NOTE
Group Therapy Note    Date: 6/24/2021    Group Start Time: 1000  Group End Time: 9103  Group Topic: Psychotherapy    [facility] AKASH Escamilla        Group Therapy Note         Patient refused to attend psychotherapy group after encouragement from staff. 1:1 talk time offered but refused. Signature:   Cha Escamilla

## 2021-06-25 LAB
ABSOLUTE EOS #: 0.3 K/UL (ref 0–0.4)
ABSOLUTE IMMATURE GRANULOCYTE: ABNORMAL K/UL (ref 0–0.3)
ABSOLUTE LYMPH #: 1.5 K/UL (ref 1–4.8)
ABSOLUTE MONO #: 0.5 K/UL (ref 0.1–1.3)
BASOPHILS # BLD: 1 % (ref 0–2)
BASOPHILS ABSOLUTE: 0 K/UL (ref 0–0.2)
DIFFERENTIAL TYPE: ABNORMAL
EOSINOPHILS RELATIVE PERCENT: 7 % (ref 0–4)
HCT VFR BLD CALC: 27.7 % (ref 36–46)
HEMOGLOBIN: 9.1 G/DL (ref 12–16)
IMMATURE GRANULOCYTES: ABNORMAL %
LYMPHOCYTES # BLD: 29 % (ref 24–44)
MCH RBC QN AUTO: 31.8 PG (ref 26–34)
MCHC RBC AUTO-ENTMCNC: 32.8 G/DL (ref 31–37)
MCV RBC AUTO: 96.7 FL (ref 80–100)
MONOCYTES # BLD: 11 % (ref 1–7)
NRBC AUTOMATED: ABNORMAL PER 100 WBC
PDW BLD-RTO: 13.6 % (ref 11.5–14.9)
PLATELET # BLD: 207 K/UL (ref 150–450)
PLATELET ESTIMATE: ABNORMAL
PMV BLD AUTO: 8 FL (ref 6–12)
RBC # BLD: 2.86 M/UL (ref 4–5.2)
RBC # BLD: ABNORMAL 10*6/UL
SEG NEUTROPHILS: 52 % (ref 36–66)
SEGMENTED NEUTROPHILS ABSOLUTE COUNT: 2.7 K/UL (ref 1.3–9.1)
WBC # BLD: 5.1 K/UL (ref 3.5–11)
WBC # BLD: ABNORMAL 10*3/UL

## 2021-06-25 PROCEDURE — 85025 COMPLETE CBC W/AUTO DIFF WBC: CPT

## 2021-06-25 PROCEDURE — 36415 COLL VENOUS BLD VENIPUNCTURE: CPT

## 2021-06-25 PROCEDURE — 99232 SBSQ HOSP IP/OBS MODERATE 35: CPT | Performed by: PSYCHIATRY & NEUROLOGY

## 2021-06-25 PROCEDURE — 6370000000 HC RX 637 (ALT 250 FOR IP)

## 2021-06-25 PROCEDURE — 1240000000 HC EMOTIONAL WELLNESS R&B

## 2021-06-25 PROCEDURE — 6370000000 HC RX 637 (ALT 250 FOR IP): Performed by: PSYCHIATRY & NEUROLOGY

## 2021-06-25 RX ADMIN — TRAZODONE HYDROCHLORIDE 50 MG: 50 TABLET ORAL at 20:39

## 2021-06-25 RX ADMIN — CIPROFLOXACIN 2 DROP: 3 SOLUTION OPHTHALMIC at 13:29

## 2021-06-25 RX ADMIN — HYDROXYZINE HYDROCHLORIDE 50 MG: 50 TABLET, FILM COATED ORAL at 20:39

## 2021-06-25 RX ADMIN — ALLOPURINOL 100 MG: 100 TABLET ORAL at 08:30

## 2021-06-25 RX ADMIN — CIPROFLOXACIN 2 DROP: 3 SOLUTION OPHTHALMIC at 20:39

## 2021-06-25 RX ADMIN — OLANZAPINE 2.5 MG: 5 TABLET, FILM COATED ORAL at 20:39

## 2021-06-25 RX ADMIN — Medication 5000 UNITS: at 08:30

## 2021-06-25 NOTE — BH NOTE
Peer called writer over and reported he observed bedbugs crawling on Ms. William  as he was visiting with her tonight. Charge nurse was notified, chair was cleansed. Housekeeping was notified with request to cleanse locker used in [place] port and unit. Dr. Dario Meyer was notified and order was received to hold future visits from .

## 2021-06-25 NOTE — PLAN OF CARE
Problem: Falls - Risk of:  Goal: Will remain free from falls  Description: Will remain free from falls  6/25/2021 1630 by Keaton Aguila RN  Outcome: Ongoing  Note: Patient has remained free from falls. Problem: Altered Mood, Psychotic Behavior:  Goal: Able to verbalize decrease in frequency and intensity of hallucinations  Description: Able to verbalize decrease in frequency and intensity of hallucinations  6/25/2021 1630 by Keaton Aguila RN  Outcome: Ongoing  Note: Patient denies visual hallucinations. States her mood has improved since admission.

## 2021-06-25 NOTE — GROUP NOTE
Group Therapy Note    Date: 6/25/2021    Group Start Time: 0900  Group End Time: 7458  Group Topic: Community Meeting    LUNA Phipps, RACHAELS    Pt did not attend 0900 goal setting d/t resting in room despite staff invitation to attend. 1:1 talk time offered as alternative to group session, pt declined.             Signature:  Frederic Valdez

## 2021-06-25 NOTE — PLAN OF CARE
[id number] [facility] Interdisciplinary Treatment Plan Note     Review Date & Time: 6/25/2021 1312    Admission Type:   Admission Type: Involuntary    Reason for admission:  Reason for Admission: was found wandering and running in fields. Pt reports she was running away from her     Estimated Length of Stay Update:  Est 3-7 days, to be determined by physician  Estimated Discharge Date Update: to be determined by physician    PATIENT STRENGTHS:  Patient Strengths:Strengths: Positive Support  Patient Strengths and Limitations:Limitations: Perceives need for assistance with self-care, Multiple barriers to leisure interests  Addictive Behavior:Addictive Behavior  In the past 3 months, have you felt or has someone told you that you have a problem with:  : None  Do you have a history of Chemical Use?: No  Do you have a history of Alcohol Use?: No  Do you have a history of Street Drug Abuse?: No  Histroy of Prescripton Drug Abuse?: No  Medical Problems:   Past Medical History:   Diagnosis Date    Diabetes mellitus ([facility] [address]     Hypertension     Psychiatric problem        Risk:  Fall RiskTotal: 74  Hayden Scale Hayden Scale Score: 20  BVC Total: 0  Change in scores  NA .  Changes to plan of Care   NA     Status EXAM:   Status and Exam  Normal: No  Facial Expression: Worried, Flat  Affect: Appropriate  Level of Consciousness: Alert  Mood:Normal: No  Mood: Depressed, Anxious  Motor Activity:Normal: No  Motor Activity: Unusual Posture/Gait  Interview Behavior: Cooperative  Preception: [place] to Person, Pearla Ohms to Time  Attention:Normal: No  Attention: Distractible  Thought Processes: Circumstantial  Thought Content:Normal: No  Thought Content: Preoccupations  Hallucinations: None  Delusions: No  Delusions:  (SAMIRA, pt sleeping all of shift)  Memory:Normal: No  Memory: Poor Recent, Poor Remote  Insight and Judgment: No  Insight and Judgment: Poor Insight, Poor Judgment  Present Suicidal Ideation: No  Present Homicidal Ideation: No    Daily Assessment Last Entry:   Daily Sleep (WDL): Within Defined Limits         Patient Currently in Pain: Denies  Daily Nutrition (WDL): Exceptions to WDL  Appetite Change: Decreased  Barriers to Nutrition: Elderly patient  Level of Assistance: Partial assist    Patient Monitoring:  Frequency of Checks: 4 times per hour, close    Psychiatric Symptoms:   Depression Symptoms  Depression Symptoms: Impaired concentration, Sleep disturbance  Anxiety Symptoms  Anxiety Symptoms: Generalized  Judith Symptoms  Judith Symptoms: No problems reported or observed. Psychosis Symptoms  Delusion Type: No problems reported or observed.     Suicide Risk CSSR-S:  1) Within the past month, have you wished you were dead or wished you could go to sleep and not wake up? : No  2) Have you actually had any thoughts of killing yourself? : No  6) Have you ever done anything, started to do anything, or prepared to do anything to end your life?: No  Change in Result  NA  Change in Plan of care NA     EDUCATION:   Learner Progress Toward Treatment Goals: Reviewed goals and plan of care    Method: Small group    Outcome: Needs reinforcement    PATIENT GOALS:  Patient attended but was unable to identify her short/long term goals     PLAN/TREATMENT RECOMMENDATIONS UPDATE:   COPING SKILLS CONTINUE WITH GROUP THERAPIES POSITIVE INTERACTIONS, GOAL SETTING    SHORT-TERM GOALS UPDATE:  Time frame for Short-Term Goals: 1-2 WEEKS     LONG-TERM GOALS UPDATE:  Time frame for Long-Term Goals: 6 MONTHS  Members Present in Team Meeting: See Signature Sheet    Ingrid Young

## 2021-06-25 NOTE — PROGRESS NOTES
Daily Progress Note  6/25/2021    Patient Name: Torsten Collazo COMPLAINT: Acute psychosis         SUBJECTIVE:      The patient continues to be discharged focused. She has been asking for help when she needs it. She continues to have no insight into her symptoms. Communication difficulties remain with the patient because she is hard of hearing and is unable to read. The patient denies any auditory or visual hallucinations. Appetite:  [] Adequate/Unchanged  [] Increased  [x] Decreased, improving    Sleep:       [x] Adequate/Unchanged  [x] Fair  [] Poor      Group Attendance on Unit:   [] Yes  [] Selectively    [x] No    Medication Side Effects: Denies         Mental Status Exam  Level of consciousness: Awake and alert  Appearance: Casually dressed with fair grooming and fair hygiene   behavior/Motor: Approachable, continues to require assistance with ADLs, Attitude toward examiner: Cooperative, attentive, intense eye contact  Speech: Loud  Mood: Anxious  Affect: Mood congruent  Thought processes: Mostly linear and coherent, tangential statements are redirectable   thought content: Ruminates on safety and returning to her home, Denies homicidal ideation  Suicidal Ideation: Denies suicidal ideations, contracts for safety on the unit. Delusions: Denies  Perceptual Disturbance: None apparent    Cognition: Oriented to person place and situation   memory: impaired   Insight: poor   Judgement: poor       Data   temperature is 97.9 °F (36.6 °C). Her blood pressure is 114/69 and her pulse is 84. Her respiration is 14. Labs:   No results displayed because visit has over 200 results. Reviewed patient's current plan of care and vital signs with nursing staff.     Labs reviewed: [x] Yes    Medications  Current Facility-Administered Medications: ciprofloxacin (CILOXAN) 0.3 % ophthalmic solution 2 drop, 2 drop, Right Eye, Q4H While awake  acetaminophen (TYLENOL) tablet 650 mg, 650 mg, Oral, Q4H PRN  ibuprofen (ADVIL;MOTRIN) tablet 400 mg, 400 mg, Oral, Q6H PRN  polyethylene glycol (GLYCOLAX) packet 17 g, 17 g, Oral, Daily PRN  aluminum & magnesium hydroxide-simethicone (MAALOX) 200-200-20 MG/5ML suspension 30 mL, 30 mL, Oral, Q6H PRN  hydrOXYzine (ATARAX) tablet 50 mg, 50 mg, Oral, TID PRN  traZODone (DESYREL) tablet 50 mg, 50 mg, Oral, Nightly PRN  allopurinol (ZYLOPRIM) tablet 100 mg, 100 mg, Oral, Daily  vitamin D3 (CHOLECALCIFEROL) tablet 5,000 Units, 1 tablet, Oral, Daily  OLANZapine (ZYPREXA) tablet 2.5 mg, 2.5 mg, Oral, Nightly    ASSESSMENT  Acute psychosis ([facility] [address]         PLAN  Patient symptoms are: Modest improvement  No changes to current medications. Monitor need and frequency of PRN medications. Internal medicine to continue following for medical management  Continue line of sight for fall precautions  Encourage participation in groups and milieu. Attempt to develop insight. Psycho-education conducted. Supportive Therapy conducted. Probable discharge is per attending physician, PASSR complete and pending. Guardianship would be beneficial.  Adult Protective Services will be notified by social work  Follow-up daily while inpatient. Patient continues to be monitored in the inpatient psychiatric facility at [facility] for safety and stabilization. Patient continues to need, on a daily basis, active treatment furnished directly by or requiring the supervision of inpatient psychiatric personnel.     Electronically signed by Chance Perez MD on 6/25/2021 at 5:32 PM

## 2021-06-25 NOTE — PLAN OF CARE
Problem: Altered Mood, Deterioration in Function:  Goal: Able to verbalize reality based thinking  Description: Able to verbalize reality based thinking  6/24/2021 2140 by Hernandez Ortiz LPN  Outcome: Ongoing     Problem: Altered Mood, Psychotic Behavior:  Goal: Able to verbalize decrease in frequency and intensity of hallucinations  Description: Able to verbalize decrease in frequency and intensity of hallucinations  Outcome: Ongoing     Problem: Falls - Risk of:  Goal: Will remain free from falls  Description: Will remain free from falls  6/24/2021 2140 by Hernandez Ortiz LPN  Note: Patient in wheelchair and is assisted with transfers. Patient calls out for help when she wants moved.

## 2021-06-25 NOTE — GROUP NOTE
Group Therapy Note    Date: 6/25/2021    Group Start Time: 1000  Group End Time: 18  Group Topic: Psychotherapy    [facility]    NADEEM Null LSW        Group Therapy Note    patient refused to attend psychotherapy group at 10:00am after encouragement from staff.          Signature:  NADEEM Null LSW

## 2021-06-25 NOTE — GROUP NOTE
Group Therapy Note    Date: 6/25/2021    Group Start Time: 1430  Group End Time: 3317  Group Topic: Psychoeducation    LUNA Oliveira      Patient declined to attend relaxation group at 1430 despite encouragement from staff. 1:1 talk time offered by staff as alternative to group session.         Signature:  Natalie Oliveira

## 2021-06-25 NOTE — GROUP NOTE
Group Therapy Note    Date: 6/25/2021    Group Start Time: 1330  Group End Time: 9040  Group Topic: Coping skills     LUNA Montelongo, CTRS    Group Therapy Note    Attendees: 6    Patient's Goal:  To improve coping skills       Status After Intervention:  unchanged    Participation Level:  Active Listener and Interactive with assistance    Participation Quality: needs multiple redirections      Speech:  Loud      Thought Process/Content: disorganized    Affective Functioning: flat    Mood: anxious    Level of conscousness:  Alert    Response to Learning:  Progressing to goal    Endings: None Reported    Modes of Intervention: Education, Support and Problem-solving      Discipline Responsible: Psychoeducational Specialist      Signature:  Ricardo Sparks

## 2021-06-26 LAB
ABSOLUTE EOS #: 0.5 K/UL (ref 0–0.4)
ABSOLUTE IMMATURE GRANULOCYTE: ABNORMAL K/UL (ref 0–0.3)
ABSOLUTE LYMPH #: 1.9 K/UL (ref 1–4.8)
ABSOLUTE MONO #: 0.7 K/UL (ref 0.1–1.3)
BASOPHILS # BLD: 1 % (ref 0–2)
BASOPHILS ABSOLUTE: 0.1 K/UL (ref 0–0.2)
DIFFERENTIAL TYPE: ABNORMAL
EOSINOPHILS RELATIVE PERCENT: 8 % (ref 0–4)
HCT VFR BLD CALC: 25.7 % (ref 36–46)
HEMOGLOBIN: 8.5 G/DL (ref 12–16)
IMMATURE GRANULOCYTES: ABNORMAL %
LYMPHOCYTES # BLD: 32 % (ref 24–44)
MCH RBC QN AUTO: 31.5 PG (ref 26–34)
MCHC RBC AUTO-ENTMCNC: 33.3 G/DL (ref 31–37)
MCV RBC AUTO: 94.8 FL (ref 80–100)
MONOCYTES # BLD: 13 % (ref 1–7)
NRBC AUTOMATED: ABNORMAL PER 100 WBC
PDW BLD-RTO: 13.7 % (ref 11.5–14.9)
PLATELET # BLD: 219 K/UL (ref 150–450)
PLATELET ESTIMATE: ABNORMAL
PMV BLD AUTO: 7.8 FL (ref 6–12)
RBC # BLD: 2.71 M/UL (ref 4–5.2)
RBC # BLD: ABNORMAL 10*6/UL
SEG NEUTROPHILS: 46 % (ref 36–66)
SEGMENTED NEUTROPHILS ABSOLUTE COUNT: 2.7 K/UL (ref 1.3–9.1)
WBC # BLD: 5.8 K/UL (ref 3.5–11)
WBC # BLD: ABNORMAL 10*3/UL

## 2021-06-26 PROCEDURE — 99231 SBSQ HOSP IP/OBS SF/LOW 25: CPT | Performed by: NURSE PRACTITIONER

## 2021-06-26 PROCEDURE — 85025 COMPLETE CBC W/AUTO DIFF WBC: CPT

## 2021-06-26 PROCEDURE — 6370000000 HC RX 637 (ALT 250 FOR IP): Performed by: PSYCHIATRY & NEUROLOGY

## 2021-06-26 PROCEDURE — 6370000000 HC RX 637 (ALT 250 FOR IP)

## 2021-06-26 PROCEDURE — 1240000000 HC EMOTIONAL WELLNESS R&B

## 2021-06-26 PROCEDURE — 36415 COLL VENOUS BLD VENIPUNCTURE: CPT

## 2021-06-26 RX ADMIN — HYDROXYZINE HYDROCHLORIDE 50 MG: 50 TABLET, FILM COATED ORAL at 20:24

## 2021-06-26 RX ADMIN — TRAZODONE HYDROCHLORIDE 50 MG: 50 TABLET ORAL at 20:24

## 2021-06-26 RX ADMIN — CIPROFLOXACIN 2 DROP: 3 SOLUTION OPHTHALMIC at 18:37

## 2021-06-26 RX ADMIN — OLANZAPINE 2.5 MG: 5 TABLET, FILM COATED ORAL at 20:23

## 2021-06-26 RX ADMIN — CIPROFLOXACIN 2 DROP: 3 SOLUTION OPHTHALMIC at 09:23

## 2021-06-26 RX ADMIN — CIPROFLOXACIN 2 DROP: 3 SOLUTION OPHTHALMIC at 20:24

## 2021-06-26 RX ADMIN — ALLOPURINOL 100 MG: 100 TABLET ORAL at 09:19

## 2021-06-26 RX ADMIN — CIPROFLOXACIN 2 DROP: 3 SOLUTION OPHTHALMIC at 14:01

## 2021-06-26 RX ADMIN — Medication 5000 UNITS: at 09:20

## 2021-06-26 NOTE — PLAN OF CARE
Problem: Altered Mood, Deterioration in Function:  Goal: Able to verbalize reality based thinking  Description: Able to verbalize reality based thinking  6/25/2021 2359 by Sammie Christopher  Outcome: Ongoing  Note: Patient is confused at times but denies suicidal ideation or hallucinations. Out and social, cooperative. Compliant with all prescribed medications during this shift. Safety checks maintained q15min and irregular rounding. Problem: Falls - Risk of:  Goal: Will remain free from falls  Description: Will remain free from falls  6/25/2021 2359 by Sammie Christopher  Outcome: Ongoing  Note: Pt remains free of falls and verbalizes understanding of individual fall risks. Pt wearing non skid footwear and encouraged to seek out staff for any assistance needed.

## 2021-06-26 NOTE — GROUP NOTE
Group Therapy Note    Date: 6/26/2021    Group Start Time: 0900  Group End Time: 0930  Group Topic: Community Meeting    Abbey Corral        Group Therapy Note    Attendees: 6/15         Pt did not participate in Community Meeting/Goals Group at 900am when encouraged by RT due to resting in room. Pt was offered talk time as an alternative to group but declined.         Discipline Responsible: Psychoeducational Specialist        Signature:  Lselie Waldrop

## 2021-06-26 NOTE — PLAN OF CARE
Problem: Falls - Risk of:  Goal: Will remain free from falls  Description: Will remain free from falls  6/26/2021 1056 by Jaxson Yuen RN  Outcome: Ongoing  Note: Patient remains free from falls throughout the shift and is a 1 person assist.  6/25/2021 2359 by Ananya Lorenz  Outcome: Ongoing  Note: Pt remains free of falls and verbalizes understanding of individual fall risks. Pt wearing non skid footwear and encouraged to seek out staff for any assistance needed. Goal: Absence of physical injury  Description: Absence of physical injury  Outcome: Ongoing  Note: Patient remains free from physical harm throughout the shift and is a 1 person assist.     Problem: Altered Mood, Deterioration in Function:  Goal: Able to verbalize reality based thinking  Description: Able to verbalize reality based thinking  6/26/2021 1056 by Jaxson Yuen RN  Outcome: Ongoing  Note: Patient is oriented to person and time, but is unable to state she is admitted to the hospital or what situations occurred that brought her to the hospital.  6/25/2021 2359 by Ananya Lorenz  Outcome: Ongoing  Note: Patient is confused at times but denies suicidal ideation or hallucinations. Out and social, cooperative. Compliant with all prescribed medications during this shift. Safety checks maintained q15min and irregular rounding. Problem: Altered Mood, Psychotic Behavior:  Goal: Able to verbalize decrease in frequency and intensity of hallucinations  Description: Able to verbalize decrease in frequency and intensity of hallucinations  6/26/2021 1056 by Jaxson Yuen RN  Outcome: Ongoing  Note: Patient denies any audio or visual hallucinations and does not appear to be responding to internal stimuli. 6/25/2021 2359 by Annaya Lorenz  Outcome: Ongoing  Note: Pt denies thoughts of self harm and is agreeable to seeking out should thoughts of self harm arise. Safe environment maintained.   Q15 minute checks for safety cont per unit policy. Will cont to monitor for safety and provides support and reassurance as needed. Problem: Nutrition  Goal: Optimal nutrition therapy  Outcome: Ongoing     Problem: Musculor/Skeletal Functional Status  Goal: Highest potential functional level  Outcome: Ongoing  Goal: Absence of falls  Outcome: Ongoing     Problem: Altered Mood, Deterioration in Function:  Intervention: Manage a safe environment  Note: Patient is currently a line of sight due to her high fall risk and her room is right beside the nurse's station with the door left open. Problem: Altered Mood, Psychotic Behavior:  Intervention: Manage a safe environment  Note: Patient is currently a line of sight due to her high fall risk and her room is right beside the nurse's station with the door left open.

## 2021-06-26 NOTE — BH NOTE
Writer spoke with Megan Zamora NP and discussed discontinuing the patient's line of sight order due to unsteady gait. The patient is not trying to get out of bed without assistance, and is asking for help/assistance appropriately when needed. Safety checks are maintained every 15 minutes and at irregular intervals.

## 2021-06-26 NOTE — PROGRESS NOTES
Daily Progress Note  6/26/2021    Patient Name: Gagandeep     CHIEF COMPLAINT: Acute psychosis         SUBJECTIVE:      Patient is compliant with her medications, using trazodone and Atarax as needed for anxiety and sleep. Per staff, patient is able to make needs known, has not attempted to ambulate or leave her bed without assistance. Will discontinue line of sight for now, patient to remain in room near nurses station. Patient in wheelchair in common area. Smiles upon approach. Patient is hard of hearing, required to speak loudly in order for patient to understand what is being said. She states that her mood is better since admission, she is anxious and would like to go home. Patient lacks insight into events leading up to hospitalization, she believes she needs to go home to clean the house. She states that she has not been sleeping well at night. She endorses that she is able to ask nursing staff for assistance to have needs met. Per staff patient is eating her meals. Patient knew today was Saturday. Knew the year was 2021, did not know the month or day. She knew she was in the hospital, could not remember the name of the hospital.  Stated she believes she was in the hospital to have her \"cataracts\" fixed. Patient CBC shows H&H trending down. .  We will have internal medicine see patient and assess.     Appetite:  [] Adequate/Unchanged  [] Increased  [x] Decreased, improving    Sleep:       [] Adequate/Unchanged  [x] Fair  [] Poor      Group Attendance on Unit:   [] Yes  [] Selectively    [x] No    Medication Side Effects: Denies         Mental Status Exam  Level of consciousness: Awake and alert  Appearance: Casually dressed with fair grooming and fair hygiene   behavior/Motor: Approachable, continues to require assistance with ADLs, Attitude toward examiner: Cooperative, attentive, intense eye contact  Speech: Loud  Mood: Anxious  Affect: Mood congruent  Thought processes: Mostly linear and coherent, tangential statements are redirectable   thought content: Ruminates on safety and returning to her home, Denies homicidal ideation  Suicidal Ideation: Denies suicidal ideations, contracts for safety on the unit. Delusions: Denies  Perceptual Disturbance: None apparent    Cognition: Oriented to person place only  memory: impaired   Insight: poor   Judgement: poor       Data   oral temperature is 98.4 °F (36.9 °C). Her blood pressure is 108/49 (abnormal) and her pulse is 63. Her respiration is 16. Labs:   No results displayed because visit has over 200 results. Reviewed patient's current plan of care and vital signs with nursing staff. Labs reviewed: [x] Yes    Medications  Current Facility-Administered Medications: ciprofloxacin (CILOXAN) 0.3 % ophthalmic solution 2 drop, 2 drop, Right Eye, Q4H While awake  acetaminophen (TYLENOL) tablet 650 mg, 650 mg, Oral, Q4H PRN  ibuprofen (ADVIL;MOTRIN) tablet 400 mg, 400 mg, Oral, Q6H PRN  polyethylene glycol (GLYCOLAX) packet 17 g, 17 g, Oral, Daily PRN  aluminum & magnesium hydroxide-simethicone (MAALOX) 200-200-20 MG/5ML suspension 30 mL, 30 mL, Oral, Q6H PRN  hydrOXYzine (ATARAX) tablet 50 mg, 50 mg, Oral, TID PRN  traZODone (DESYREL) tablet 50 mg, 50 mg, Oral, Nightly PRN  allopurinol (ZYLOPRIM) tablet 100 mg, 100 mg, Oral, Daily  vitamin D3 (CHOLECALCIFEROL) tablet 5,000 Units, 1 tablet, Oral, Daily  OLANZapine (ZYPREXA) tablet 2.5 mg, 2.5 mg, Oral, Nightly    ASSESSMENT  Acute psychosis ([facility] [address]         PLAN  Patient symptoms are: Modest improvement  No changes to current medications. Monitor need and frequency of PRN medications. Internal medicine to continue following for medical management  Discontinue line of sight  Encourage participation in groups and milieu. Attempt to develop insight. Psycho-education conducted. Supportive Therapy conducted. Probable discharge is per attending physician, PASSR complete and pending.   Guardianship would be beneficial.  Adult Protective Services will be notified by social work  Follow-up daily while inpatient. Patient continues to be monitored in the inpatient psychiatric facility at [facility] for safety and stabilization. Patient continues to need, on a daily basis, active treatment furnished directly by or requiring the supervision of inpatient psychiatric personnel.     Electronically signed by PEREZ Reddy CNP on 6/26/2021 at 3:28 PM

## 2021-06-26 NOTE — GROUP NOTE
Group Therapy Note    Date: 6/26/2021    Group Start Time: 1330  Group End Time: 2040  Group Topic: Cognitive Skills    LUNA Mcalughlin, CTRS        Group Therapy Note    Attendees: 7/15         Pt did not participate in Cognitive Skills Group at 1330 when encouraged by RT due to pt up in group area but chooses not to sit with group and declines attending group when encouraged several times. Pt prefers to sit and watch activity outside of window, pt makes frequent requests for crackers and x1 to go to restroom which pt is provided with assistance by RN. Pt was offered talk time as an alternative to group but declined.       Discipline Responsible: Psychoeducational Specialist      Signature:  Colin Guillen

## 2021-06-27 LAB
ABSOLUTE EOS #: 0.4 K/UL (ref 0–0.4)
ABSOLUTE IMMATURE GRANULOCYTE: ABNORMAL K/UL (ref 0–0.3)
ABSOLUTE LYMPH #: 1.7 K/UL (ref 1–4.8)
ABSOLUTE MONO #: 0.5 K/UL (ref 0.1–1.3)
BASOPHILS # BLD: 1 % (ref 0–2)
BASOPHILS ABSOLUTE: 0.1 K/UL (ref 0–0.2)
DIFFERENTIAL TYPE: ABNORMAL
EOSINOPHILS RELATIVE PERCENT: 6 % (ref 0–4)
HCT VFR BLD CALC: 28.1 % (ref 36–46)
HEMOGLOBIN: 9 G/DL (ref 12–16)
IMMATURE GRANULOCYTES: ABNORMAL %
LYMPHOCYTES # BLD: 28 % (ref 24–44)
MCH RBC QN AUTO: 31.8 PG (ref 26–34)
MCHC RBC AUTO-ENTMCNC: 32 G/DL (ref 31–37)
MCV RBC AUTO: 99.4 FL (ref 80–100)
MONOCYTES # BLD: 9 % (ref 1–7)
NRBC AUTOMATED: ABNORMAL PER 100 WBC
PDW BLD-RTO: 13.6 % (ref 11.5–14.9)
PLATELET # BLD: 232 K/UL (ref 150–450)
PLATELET ESTIMATE: ABNORMAL
PMV BLD AUTO: 7.4 FL (ref 6–12)
RBC # BLD: 2.82 M/UL (ref 4–5.2)
RBC # BLD: ABNORMAL 10*6/UL
SEG NEUTROPHILS: 56 % (ref 36–66)
SEGMENTED NEUTROPHILS ABSOLUTE COUNT: 3.4 K/UL (ref 1.3–9.1)
WBC # BLD: 6 K/UL (ref 3.5–11)
WBC # BLD: ABNORMAL 10*3/UL

## 2021-06-27 PROCEDURE — 6370000000 HC RX 637 (ALT 250 FOR IP): Performed by: PSYCHIATRY & NEUROLOGY

## 2021-06-27 PROCEDURE — 6370000000 HC RX 637 (ALT 250 FOR IP): Performed by: NURSE PRACTITIONER

## 2021-06-27 PROCEDURE — 36415 COLL VENOUS BLD VENIPUNCTURE: CPT

## 2021-06-27 PROCEDURE — 99232 SBSQ HOSP IP/OBS MODERATE 35: CPT | Performed by: NURSE PRACTITIONER

## 2021-06-27 PROCEDURE — 85025 COMPLETE CBC W/AUTO DIFF WBC: CPT

## 2021-06-27 PROCEDURE — 6370000000 HC RX 637 (ALT 250 FOR IP)

## 2021-06-27 PROCEDURE — 99222 1ST HOSP IP/OBS MODERATE 55: CPT | Performed by: INTERNAL MEDICINE

## 2021-06-27 PROCEDURE — 1240000000 HC EMOTIONAL WELLNESS R&B

## 2021-06-27 RX ORDER — OLANZAPINE 5 MG/1
5 TABLET ORAL NIGHTLY
Status: DISCONTINUED | OUTPATIENT
Start: 2021-06-27 | End: 2021-07-05

## 2021-06-27 RX ADMIN — HYDROXYZINE HYDROCHLORIDE 50 MG: 50 TABLET, FILM COATED ORAL at 20:30

## 2021-06-27 RX ADMIN — OLANZAPINE 5 MG: 5 TABLET, FILM COATED ORAL at 20:29

## 2021-06-27 RX ADMIN — CIPROFLOXACIN 2 DROP: 3 SOLUTION OPHTHALMIC at 10:03

## 2021-06-27 RX ADMIN — Medication 5000 UNITS: at 10:03

## 2021-06-27 RX ADMIN — CIPROFLOXACIN 2 DROP: 3 SOLUTION OPHTHALMIC at 20:30

## 2021-06-27 RX ADMIN — TRAZODONE HYDROCHLORIDE 50 MG: 50 TABLET ORAL at 20:30

## 2021-06-27 RX ADMIN — ALLOPURINOL 100 MG: 100 TABLET ORAL at 10:03

## 2021-06-27 ASSESSMENT — PAIN SCALES - GENERAL: PAINLEVEL_OUTOF10: 0

## 2021-06-27 NOTE — PROGRESS NOTES
Daily Progress Note  6/27/2021    Patient Name: Jay Iglesias    CHIEF COMPLAINT: Acute psychosis         SUBJECTIVE:      Staff reports no overnight events, patient remains medication compliant. She was evaluated by internal medicine who is doing a further work-up for anemia. She was interviewed today bedside and reports that she is \"tired\". She endorses having difficulty sleeping even with the as needed trazodone. She states \"I need medication to help me sleep\". We discussed the risks and benefits of increasing her olanzapine at bedtime patient is agreeable to this plan of care. We will monitor for improvement in sleep. Her PASSR was approved therefore disposition to [facility] is pending facility acceptance. Appetite:  [] Adequate/Unchanged  [] Increased  [x] Decreased, improving    Sleep:       [] Adequate/Unchanged  [x] Fair  [] Poor      Group Attendance on Unit:   [] Yes  [] Selectively    [x] No    Medication Side Effects: Denies         Mental Status Exam  Level of consciousness: Resting, easily arousable to verbal stimulus  Appearance: Casually dressed with fair grooming and fair hygiene   behavior/Motor: Approachable, continues to require assistance with ADLs   Attitude toward examiner: Cooperative, attentive, good eye contact  Speech: Loud, clear, well articulated  Mood: \"Tired\"  Affect: Congruent, somewhat anxious  Thought processes: Mostly linear and coherent, tangential statements are redirectable   thought content:  Denies homicidal ideation  Suicidal Ideation: Denies suicidal ideations, contracts for safety on the unit. Delusions: Denies  Perceptual Disturbance: does not appear to attend to internal stimuli  Cognition: Oriented to person, place only  memory: impaired   Insight: poor   Judgement: poor       Data   oral temperature is 98.3 °F (36.8 °C). Her blood pressure is 93/71 and her pulse is 68. Her respiration is 16. Labs:   No results displayed because visit has over 200 results.

## 2021-06-27 NOTE — GROUP NOTE
Group Therapy Note    Date: 6/27/2021    Group Start Time: 0900  Group End Time: 0930  Group Topic: Community Meeting    LUNA Wilson, [address]        Group Therapy Note    Attendees: 6/15         Pt did not participate in Community Meeting/Goals Group at 900am when encouraged by RT due to sleeping soundly in room.      Discipline Responsible: Psychoeducational Specialist      Signature:  Katie Montes De Oca

## 2021-06-27 NOTE — PLAN OF CARE
Problem: Falls - Risk of:  Goal: Will remain free from falls  Description: Will remain free from falls  6/26/2021 2121 by Boubacar Mendes RN  Outcome: Ongoing  Note: Patient remains free from falls and physical injury during assessment. Problem: Altered Mood, Deterioration in Function:  Goal: Able to verbalize reality based thinking  Description: Able to verbalize reality based thinking  6/26/2021 2121 by Boubacar Mendes RN  Outcome: Ongoing  Note: Patient is reality based when speaking with her 1:1. Patient is focused on going to an extended care facility and is worried about getting her clothing back when she leaves. Problem: Altered Mood, Psychotic Behavior:  Goal: Able to verbalize decrease in frequency and intensity of hallucinations  Description: Able to verbalize decrease in frequency and intensity of hallucinations  6/26/2021 2121 by Boubacar Mendes RN  Outcome: Ongoing  Note: Patient denies hallucinations on assessment. Problem: Musculor/Skeletal Functional Status  Goal: Absence of falls  6/26/2021 2121 by Boubacar Mendes RN  Outcome: Ongoing  Note: Patient remains absent of falls.

## 2021-06-27 NOTE — PLAN OF CARE
Problem: Falls - Risk of:  Goal: Will remain free from falls  Description: Will remain free from falls  Outcome: Ongoing  Note: Patient remains free from falls throughout the shift. Goal: Absence of physical injury  Description: Absence of physical injury  Outcome: Ongoing  Note: Patient remains free from physical injury throughout the shift. Problem: Altered Mood, Deterioration in Function:  Goal: Able to verbalize reality based thinking  Description: Able to verbalize reality based thinking  Outcome: Ongoing  Note: Patient is oriented to self only and is pleasantly confused. Problem: Altered Mood, Psychotic Behavior:  Goal: Able to verbalize decrease in frequency and intensity of hallucinations  Description: Able to verbalize decrease in frequency and intensity of hallucinations  Outcome: Ongoing  Note: Patient does not appear to be responding to internal stimuli. Problem: Altered Mood, Deterioration in Function:  Intervention: Manage a safe environment  Note: Patient's room is within view of the nurse's station. Problem: Altered Mood, Psychotic Behavior:  Intervention: Manage a safe environment  Note: Patient's room is within view of the nurse's station.

## 2021-06-27 NOTE — CONSULTS
[facility]    CONSULTATION / HISTORY AND PHYSICAL EXAMINATION            Date:   6/27/2021  Patient name:  Brian Lancaster  Date of admission:  6/18/2021  2:25 AM  MRN:   [id number]  Account:  [de-identified]  YOB: 1945  PCP:    No primary care provider on file. Room:   [address] [place] [address]  Code Status:    Full Code    Physician Requesting Consult: Janes Huang MD    Reason for Consult:  medical management    Chief Complaint:     No chief complaint on file. Medical management    History Obtained From:     Patient medical record nursing staff    History of Present Illness:     Patient is uncooperative unable to get history of presenting illness mostly noted from nursing staff and medical records history of diabetes and hypertension not on any medication no hypoglycemia noted elderly lady with a false dentures and deconditioning    Patient sitting out normal conversation hard of hearing    Past Medical History:     Past Medical History:   Diagnosis Date    Diabetes mellitus ([facility] [address]     Hypertension     Psychiatric problem         Past Surgical History:     History reviewed. No pertinent surgical history. Medications Prior to Admission:     Prior to Admission medications    Medication Sig Start Date End Date Taking?  Authorizing Provider   allopurinol (ZYLOPRIM) 100 MG tablet Take 100 mg by mouth daily    Historical Provider, MD   amLODIPine (NORVASC) 5 MG tablet Take 5 mg by mouth daily    Historical Provider, MD   Cholecalciferol (VITAMIN D3) 1.25 MG (27805 UT) CAPS Take by mouth    Historical Provider, MD   divalproex (DEPAKOTE ER) 500 MG extended release tablet Take 500 mg by mouth daily    Historical Provider, MD   metFORMIN (GLUCOPHAGE) 500 MG tablet Take 500 mg by mouth 2 times daily (with meals)    Historical Provider, MD   OLANZapine (ZYPREXA) 2.5 MG tablet Take 2.5 mg by mouth nightly    Historical Provider, MD   PARoxetine (PAXIL) 20 MG tablet Take

## 2021-06-27 NOTE — GROUP NOTE
Group Therapy Note    Date: 6/27/2021    Group Start Time: 1330  Group End Time: 7220  Group Topic: Recreational    LUNA Jacobsno Courser, CTRS        Group Therapy Note    Attendees: 8/14       Pt did not participate in Recreation Group at [phone] when encouraged by RT due to resting in room. Pt was offered talk time as an alternative to group but declined.          Discipline Responsible: Psychoeducational Specialist        Signature:  Fanta Tierney

## 2021-06-28 LAB
ABSOLUTE EOS #: 0.4 K/UL (ref 0–0.4)
ABSOLUTE IMMATURE GRANULOCYTE: ABNORMAL K/UL (ref 0–0.3)
ABSOLUTE LYMPH #: 2.3 K/UL (ref 1–4.8)
ABSOLUTE MONO #: 0.6 K/UL (ref 0.1–1.3)
BASOPHILS # BLD: 1 % (ref 0–2)
BASOPHILS ABSOLUTE: 0.1 K/UL (ref 0–0.2)
DIFFERENTIAL TYPE: ABNORMAL
EOSINOPHILS RELATIVE PERCENT: 7 % (ref 0–4)
FOLATE: 6 NG/ML
HCT VFR BLD CALC: 27.6 % (ref 36–46)
HEMOGLOBIN: 9 G/DL (ref 12–16)
IMMATURE GRANULOCYTES: ABNORMAL %
LYMPHOCYTES # BLD: 40 % (ref 24–44)
MCH RBC QN AUTO: 31.3 PG (ref 26–34)
MCHC RBC AUTO-ENTMCNC: 32.6 G/DL (ref 31–37)
MCV RBC AUTO: 96 FL (ref 80–100)
MONOCYTES # BLD: 11 % (ref 1–7)
NRBC AUTOMATED: ABNORMAL PER 100 WBC
PDW BLD-RTO: 13.5 % (ref 11.5–14.9)
PLATELET # BLD: 238 K/UL (ref 150–450)
PLATELET ESTIMATE: ABNORMAL
PMV BLD AUTO: 7 FL (ref 6–12)
RBC # BLD: 2.88 M/UL (ref 4–5.2)
RBC # BLD: ABNORMAL 10*6/UL
SEG NEUTROPHILS: 41 % (ref 36–66)
SEGMENTED NEUTROPHILS ABSOLUTE COUNT: 2.4 K/UL (ref 1.3–9.1)
VITAMIN B-12: 530 PG/ML (ref 232–1245)
WBC # BLD: 5.7 K/UL (ref 3.5–11)
WBC # BLD: ABNORMAL 10*3/UL

## 2021-06-28 PROCEDURE — APPSS15 APP SPLIT SHARED TIME 0-15 MINUTES: Performed by: NURSE PRACTITIONER

## 2021-06-28 PROCEDURE — 83516 IMMUNOASSAY NONANTIBODY: CPT

## 2021-06-28 PROCEDURE — 99232 SBSQ HOSP IP/OBS MODERATE 35: CPT | Performed by: PSYCHIATRY & NEUROLOGY

## 2021-06-28 PROCEDURE — 82607 VITAMIN B-12: CPT

## 2021-06-28 PROCEDURE — 82746 ASSAY OF FOLIC ACID SERUM: CPT

## 2021-06-28 PROCEDURE — 6370000000 HC RX 637 (ALT 250 FOR IP)

## 2021-06-28 PROCEDURE — 36415 COLL VENOUS BLD VENIPUNCTURE: CPT

## 2021-06-28 PROCEDURE — 1240000000 HC EMOTIONAL WELLNESS R&B

## 2021-06-28 PROCEDURE — 6370000000 HC RX 637 (ALT 250 FOR IP): Performed by: NURSE PRACTITIONER

## 2021-06-28 PROCEDURE — 82784 ASSAY IGA/IGD/IGG/IGM EACH: CPT

## 2021-06-28 PROCEDURE — 85025 COMPLETE CBC W/AUTO DIFF WBC: CPT

## 2021-06-28 PROCEDURE — 6370000000 HC RX 637 (ALT 250 FOR IP): Performed by: PSYCHIATRY & NEUROLOGY

## 2021-06-28 RX ADMIN — CIPROFLOXACIN 2 DROP: 3 SOLUTION OPHTHALMIC at 14:33

## 2021-06-28 RX ADMIN — TRAZODONE HYDROCHLORIDE 50 MG: 50 TABLET ORAL at 20:47

## 2021-06-28 RX ADMIN — Medication 5000 UNITS: at 08:27

## 2021-06-28 RX ADMIN — CIPROFLOXACIN 2 DROP: 3 SOLUTION OPHTHALMIC at 18:11

## 2021-06-28 RX ADMIN — CIPROFLOXACIN 2 DROP: 3 SOLUTION OPHTHALMIC at 11:12

## 2021-06-28 RX ADMIN — OLANZAPINE 5 MG: 5 TABLET, FILM COATED ORAL at 20:47

## 2021-06-28 RX ADMIN — HYDROXYZINE HYDROCHLORIDE 50 MG: 50 TABLET, FILM COATED ORAL at 20:47

## 2021-06-28 RX ADMIN — ALLOPURINOL 100 MG: 100 TABLET ORAL at 08:27

## 2021-06-28 RX ADMIN — ACETAMINOPHEN 650 MG: 325 TABLET ORAL at 20:47

## 2021-06-28 ASSESSMENT — PAIN SCALES - GENERAL: PAINLEVEL_OUTOF10: 1

## 2021-06-28 NOTE — PLAN OF CARE
Nutrition Problem #1: Severe malnutrition  Intervention: Food and/or Nutrient Delivery: Continue Current Diet, Continue Oral Nutrition Supplement  Nutritional Goals: Meet greater than 75% of estimated nutrition needs

## 2021-06-28 NOTE — GROUP NOTE
Group Therapy Note    Date: 6/28/2021    Group Start Time: 1100  Group End Time: 1610  Group Topic: Cognitive Skills    STJORDAN Gold, CTRS    Pt did not attend 1100 cognitive skills group d/t resting in room despite staff invitation to attend. 1:1 talk time offered as alternative to group session, pt declined.             Signature:  Michel Colin

## 2021-06-28 NOTE — PROGRESS NOTES
Daily Progress Note  6/28/2021    Patient Name: Higinio Perez    CHIEF COMPLAINT: Acute psychosis         SUBJECTIVE:      Staff reports no overnight events, patient remains medication compliant. She endorses improvement in sleep with the increase in olanzapine last night. She denies any side effects and reports that she is tolerating the increased dose well. Dietary continues to follow her for treatment of severe malnutrition, she is actively eating a snack during today's interview. Her PASSR was approved therefore disposition to [facility] is pending facility acceptance. Appetite:  [] Adequate/Unchanged  [] Increased  [x] Decreased, improving    Sleep:       [] Adequate/Unchanged  [x] Fair  [] Poor, improving    Group Attendance on Unit:   [] Yes  [] Selectively    [x] No    Medication Side Effects: Denies         Mental Status Exam  Level of consciousness: Awake and alert  Appearance: Casually dressed with fair grooming and fair hygiene   behavior/Motor: Approachable, continues to require assistance with ADLs, she is very hard of hearing and frequently requests author to repeat  Attitude toward examiner: Cooperative, attentive, good eye contact  Speech: Loud, clear, well articulated  Mood: \"Fine\"  Affect: Congruent  Thought processes: Mostly linear and coherent, tangential statements are redirectable   thought content:  Denies homicidal ideation  Suicidal Ideation: Denies suicidal ideations, contracts for safety on the unit. Delusions: Denies  Perceptual Disturbance: does not appear to attend to internal stimuli  Cognition: Oriented to person, place only  memory: impaired   Insight: poor   Judgement: poor, improving      Data   oral temperature is 98.2 °F (36.8 °C). Her blood pressure is 106/51 (abnormal) and her pulse is 66. Her respiration is 15 and oxygen saturation is 100%. Labs:   No results displayed because visit has over 200 results.             Reviewed patient's current plan of care and vital signs with nursing staff. Labs reviewed: [x] Yes    Medications  Current Facility-Administered Medications: OLANZapine (ZYPREXA) tablet 5 mg, 5 mg, Oral, Nightly  ciprofloxacin (CILOXAN) 0.3 % ophthalmic solution 2 drop, 2 drop, Right Eye, Q4H While awake  acetaminophen (TYLENOL) tablet 650 mg, 650 mg, Oral, Q4H PRN  ibuprofen (ADVIL;MOTRIN) tablet 400 mg, 400 mg, Oral, Q6H PRN  polyethylene glycol (GLYCOLAX) packet 17 g, 17 g, Oral, Daily PRN  aluminum & magnesium hydroxide-simethicone (MAALOX) 200-200-20 MG/5ML suspension 30 mL, 30 mL, Oral, Q6H PRN  hydrOXYzine (ATARAX) tablet 50 mg, 50 mg, Oral, TID PRN  traZODone (DESYREL) tablet 50 mg, 50 mg, Oral, Nightly PRN  allopurinol (ZYLOPRIM) tablet 100 mg, 100 mg, Oral, Daily  vitamin D3 (CHOLECALCIFEROL) tablet 5,000 Units, 1 tablet, Oral, Daily    ASSESSMENT  Acute psychosis ([facility] [address]         PLAN  Patient symptoms are: Improving  Continue current medication regimen, monitor tolerance of titrated Olanzapine  Monitor need and frequency of PRN medications. Internal medicine to continue following for medical management  Dietary following for malnutrition  Encourage participation in groups and milieu. Attempt to develop insight. Psycho-education conducted. Supportive Therapy conducted. Probable discharge is per attending physician, PASSR approved. ECF acceptance pending. Guardianship would be beneficial.  She is at a significant risk for decline if she returns home with her , she is high risk for neglect, lack of assess to basic needs (water and electricity) and overall safety. Adult Protective Services will be notified by social work  Follow-up daily while inpatient. Patient continues to be monitored in the inpatient psychiatric facility at [facility] for safety and stabilization. Patient continues to need, on a daily basis, active treatment furnished directly by or requiring the supervision of inpatient psychiatric personnel.     Electronically signed by Anthony Wilder PEREZ Aguilar - CNP on 6/28/2021 at 11:20 AM   I independently saw and evaluated the patient. I reviewed the midlevel provider's documentation above. Any additional comments or changes to the midlevel provider's documentation are stated below otherwise agree with assessment. The patient was bright in mood. She was sitting in the day area. She is denying any suicidal ideation. She continues to be discharged focused. PLAN  Medications as noted above  Attempt to develop insight  Psycho-education conducted. Supportive Therapy conducted.   Probable discharge is in 2-5 days  Follow-up daily while on inpatient unit    Electronically signed by Jorge Rosas MD on 6/28/21 at 7:54 PM EDT

## 2021-06-28 NOTE — PROGRESS NOTES
Education not indicated   Coordination of Nutrition Care:  Continue to monitor while inpatient    Goals:  Meet greater than 75% of estimated nutrition needs       Nutrition Monitoring and Evaluation:   Behavioral-Environmental Outcomes:   (Demented disorder)   Food/Nutrient Intake Outcomes:  Food and Nutrient Intake, Supplement Intake  Physical Signs/Symptoms Outcomes:  Biochemical Data, GI Status, Skin, Weight, Fluid Status or Edema     Discharge Planning:    Continue current diet     Electronically signed by Marni Rodriguez RD, LD on 6/28/21 at 11:09 AM EDT    Contact: [phone]

## 2021-06-29 LAB
ABSOLUTE EOS #: 0.4 K/UL (ref 0–0.4)
ABSOLUTE IMMATURE GRANULOCYTE: ABNORMAL K/UL (ref 0–0.3)
ABSOLUTE LYMPH #: 2 K/UL (ref 1–4.8)
ABSOLUTE MONO #: 0.6 K/UL (ref 0.1–1.3)
BASOPHILS # BLD: 1 % (ref 0–2)
BASOPHILS ABSOLUTE: 0.1 K/UL (ref 0–0.2)
DIFFERENTIAL TYPE: ABNORMAL
EOSINOPHILS RELATIVE PERCENT: 6 % (ref 0–4)
HCT VFR BLD CALC: 26.2 % (ref 36–46)
HEMOGLOBIN: 8.6 G/DL (ref 12–16)
IMMATURE GRANULOCYTES: ABNORMAL %
LYMPHOCYTES # BLD: 35 % (ref 24–44)
MCH RBC QN AUTO: 31.6 PG (ref 26–34)
MCHC RBC AUTO-ENTMCNC: 33 G/DL (ref 31–37)
MCV RBC AUTO: 95.9 FL (ref 80–100)
MONOCYTES # BLD: 10 % (ref 1–7)
NRBC AUTOMATED: ABNORMAL PER 100 WBC
PDW BLD-RTO: 13.7 % (ref 11.5–14.9)
PLATELET # BLD: 248 K/UL (ref 150–450)
PLATELET ESTIMATE: ABNORMAL
PMV BLD AUTO: 7.1 FL (ref 6–12)
RBC # BLD: 2.73 M/UL (ref 4–5.2)
RBC # BLD: ABNORMAL 10*6/UL
SEG NEUTROPHILS: 48 % (ref 36–66)
SEGMENTED NEUTROPHILS ABSOLUTE COUNT: 2.7 K/UL (ref 1.3–9.1)
WBC # BLD: 5.8 K/UL (ref 3.5–11)
WBC # BLD: ABNORMAL 10*3/UL

## 2021-06-29 PROCEDURE — 6370000000 HC RX 637 (ALT 250 FOR IP)

## 2021-06-29 PROCEDURE — 99232 SBSQ HOSP IP/OBS MODERATE 35: CPT | Performed by: PSYCHIATRY & NEUROLOGY

## 2021-06-29 PROCEDURE — 6370000000 HC RX 637 (ALT 250 FOR IP): Performed by: INTERNAL MEDICINE

## 2021-06-29 PROCEDURE — APPSS15 APP SPLIT SHARED TIME 0-15 MINUTES: Performed by: NURSE PRACTITIONER

## 2021-06-29 PROCEDURE — 6370000000 HC RX 637 (ALT 250 FOR IP): Performed by: PSYCHIATRY & NEUROLOGY

## 2021-06-29 PROCEDURE — 6370000000 HC RX 637 (ALT 250 FOR IP): Performed by: NURSE PRACTITIONER

## 2021-06-29 PROCEDURE — 85025 COMPLETE CBC W/AUTO DIFF WBC: CPT

## 2021-06-29 PROCEDURE — 36415 COLL VENOUS BLD VENIPUNCTURE: CPT

## 2021-06-29 PROCEDURE — 1240000000 HC EMOTIONAL WELLNESS R&B

## 2021-06-29 RX ADMIN — Medication 5000 UNITS: at 08:42

## 2021-06-29 RX ADMIN — HYDROXYZINE HYDROCHLORIDE 50 MG: 50 TABLET, FILM COATED ORAL at 21:20

## 2021-06-29 RX ADMIN — ACETAMINOPHEN 650 MG: 325 TABLET ORAL at 21:20

## 2021-06-29 RX ADMIN — TRAZODONE HYDROCHLORIDE 50 MG: 50 TABLET ORAL at 21:20

## 2021-06-29 RX ADMIN — CIPROFLOXACIN 2 DROP: 3 SOLUTION OPHTHALMIC at 21:21

## 2021-06-29 RX ADMIN — CIPROFLOXACIN 2 DROP: 3 SOLUTION OPHTHALMIC at 18:42

## 2021-06-29 RX ADMIN — CIPROFLOXACIN 2 DROP: 3 SOLUTION OPHTHALMIC at 08:43

## 2021-06-29 RX ADMIN — CIPROFLOXACIN 2 DROP: 3 SOLUTION OPHTHALMIC at 14:40

## 2021-06-29 RX ADMIN — ALLOPURINOL 100 MG: 100 TABLET ORAL at 08:42

## 2021-06-29 RX ADMIN — OLANZAPINE 5 MG: 5 TABLET, FILM COATED ORAL at 21:20

## 2021-06-29 ASSESSMENT — PAIN SCALES - GENERAL: PAINLEVEL_OUTOF10: 3

## 2021-06-29 NOTE — GROUP NOTE
Group Therapy Note    Date: 6/29/2021    Group Start Time: 1100  Group End Time: 1130  Group Topic: Cognitive Skills    LUNA Pichardo , CTRS    Pt did not attend Recreational therapy group at 1100 d/t resting in room despite staff invitation to attend. 1:1 talk time offered as alternative to group session, pt declined.         Signature:  Vivien Royal

## 2021-06-29 NOTE — PROGRESS NOTES
Daily Progress Note  6/29/2021    Patient Name: Cain Alarcon    CHIEF COMPLAINT: Acute psychosis         SUBJECTIVE:      Staff reports no overnight events, patient remains medication compliant. She continues to tolerate her medication regimen. She reports that she is doing \"good\". She is very nervous today and focused on her personal belongings. She states \"what I came with is all I have\". She reports being nervous that she will discharge to the nursing home without it. She was reassured that when she discharges from the hospital she will leave with all personal belongings. She reports being nervous about returning home after spending \"1 week\" in the nursing home. This Gloria Singh did try to reassure her that placement will be more permanent than returning home in 1 week. She is insistent that \"welfare\" needs to come into her home and help her  clean it up. She has poor insight into the services that are available, she was reoriented to remaining in the nursing home multiple times though continues to have anxiety about eventually returning home.     Appetite:  [x] Adequate/Unchanged  [] Increased  [] decreased    Sleep:       [x] Adequate/Unchanged  [x] Fair  [] Poor    Group Attendance on Unit:   [] Yes  [] Selectively    [x] No    Medication Side Effects: Denies         Mental Status Exam  Level of consciousness: Awake and alert  Appearance: Casually dressed with fair grooming and fair hygiene   behavior/Motor: Approachable, continues to require assistance with ADLs, she is very hard of hearing and frequently requests author to repeat  Attitude toward examiner: Cooperative, attentive, good eye contact  Speech: Loud, clear, well articulated  Mood: Anxious  Affect: Congruent, nervous (frequently grabs author's arm requesting \"authority assistance\")  Thought processes: Perseverates, redirectable to linear responses  thought content:  Denies homicidal ideation  Suicidal Ideation: Denies suicidal ideations, contracts for safety on the unit. Delusions: Denies  Perceptual Disturbance: does not appear to attend to internal stimuli  Cognition: Oriented to person, place only  memory: impaired   Insight: poor   Judgement: poor, improving      Data   oral temperature is 98 °F (36.7 °C). Her blood pressure is 117/64 and her pulse is 82. Her respiration is 14 and oxygen saturation is 100%. Labs:   No results displayed because visit has over 200 results. Reviewed patient's current plan of care and vital signs with nursing staff. Labs reviewed: [x] Yes    Medications  Current Facility-Administered Medications: OLANZapine (ZYPREXA) tablet 5 mg, 5 mg, Oral, Nightly  ciprofloxacin (CILOXAN) 0.3 % ophthalmic solution 2 drop, 2 drop, Right Eye, Q4H While awake  acetaminophen (TYLENOL) tablet 650 mg, 650 mg, Oral, Q4H PRN  ibuprofen (ADVIL;MOTRIN) tablet 400 mg, 400 mg, Oral, Q6H PRN  polyethylene glycol (GLYCOLAX) packet 17 g, 17 g, Oral, Daily PRN  aluminum & magnesium hydroxide-simethicone (MAALOX) 200-200-20 MG/5ML suspension 30 mL, 30 mL, Oral, Q6H PRN  hydrOXYzine (ATARAX) tablet 50 mg, 50 mg, Oral, TID PRN  traZODone (DESYREL) tablet 50 mg, 50 mg, Oral, Nightly PRN  allopurinol (ZYLOPRIM) tablet 100 mg, 100 mg, Oral, Daily  vitamin D3 (CHOLECALCIFEROL) tablet 5,000 Units, 1 tablet, Oral, Daily    ASSESSMENT  Acute psychosis ([facility] [address]         PLAN  Patient symptoms are: Improving  Continue current medication regimen  Monitor need and frequency of PRN medications. Internal medicine to continue following for medical management  Dietary following for malnutrition  Encourage participation in groups and milieu. Attempt to develop insight. Psycho-education conducted. Supportive Therapy conducted. Probable discharge is per attending physician, PASSR approved. ECF acceptance pending.   Guardianship would be beneficial.  She is at a significant risk for decline if she returns home with her , she is high risk for neglect, lack of assess to basic needs (water and electricity) and overall safety. Adult Protective Services will be notified by social work  Follow-up daily while inpatient. Patient continues to be monitored in the inpatient psychiatric facility at [facility] for safety and stabilization. Patient continues to need, on a daily basis, active treatment furnished directly by or requiring the supervision of inpatient psychiatric personnel. Electronically signed by PEREZ Loco CNP on 6/29/2021 at 4:08 PM   I independently saw and evaluated the patient. I reviewed the midlevel provider's documentation above. Any additional comments or changes to the midlevel provider's documentation are stated below otherwise agree with assessment. The patient's mood is stable. She has been compliant with medications. She reports no side effects. She continues to be discharged focused. She was pleasant and engaged in the interview however communication was difficult as noted before. PLAN  Medications as noted above  Attempt to develop insight  Psycho-education conducted. Supportive Therapy conducted.   Probable discharge is 1-3 days  Follow-up daily while on inpatient unit    Electronically signed by Natalie King MD on 6/29/21 at 5:39 PM EDT

## 2021-06-29 NOTE — PLAN OF CARE
Problem: Altered Mood, Deterioration in Function:  Goal: Able to verbalize reality based thinking  Description: Able to verbalize reality based thinking  6/28/2021 2145 by Marcus Nunez LPN  Outcome: Ongoing     Problem: Altered Mood, Psychotic Behavior:  Goal: Able to verbalize decrease in frequency and intensity of hallucinations  Description: Able to verbalize decrease in frequency and intensity of hallucinations  6/28/2021 2145 by Marcus Nunez LPN  Outcome: Ongoing     Problem: Falls - Risk of:  Goal: Will remain free from falls  Description: Will remain free from falls  6/28/2021 2145 by Marcus Nunez LPN  Outcome: Ongoing     Problem: Nutrition  Goal: Optimal nutrition therapy  6/28/2021 2145 by Marcus Nunez LPN  Outcome: Ongoing     Problem: Musculor/Skeletal Functional Status  Goal: Highest potential functional level  Outcome: Ongoing     Problem: Musculor/Skeletal Functional Status  Goal: Absence of falls  Outcome: Ongoing

## 2021-06-29 NOTE — GROUP NOTE
Group Therapy Note    Date: 6/29/2021    Group Start Time: 1000  Group End Time: 1030  Group Topic: Psychotherapy    LUNA Baker        Group Therapy Note         Patient refused to attend psychotherapy group after encouragement from staff. 1:1 talk time offered but refused. Signature:   Osmany Baker

## 2021-06-30 LAB
ABSOLUTE EOS #: 0.5 K/UL (ref 0–0.4)
ABSOLUTE IMMATURE GRANULOCYTE: ABNORMAL K/UL (ref 0–0.3)
ABSOLUTE LYMPH #: 2.5 K/UL (ref 1–4.8)
ABSOLUTE MONO #: 0.6 K/UL (ref 0.1–1.3)
BASOPHILS # BLD: 1 % (ref 0–2)
BASOPHILS ABSOLUTE: 0.1 K/UL (ref 0–0.2)
DIFFERENTIAL TYPE: ABNORMAL
EOSINOPHILS RELATIVE PERCENT: 7 % (ref 0–4)
GLIADIN DEAMINIDATED PEPTIDE AB IGA: 56 U/ML
GLIADIN DEAMINIDATED PEPTIDE AB IGG: 0.9 U/ML
HCT VFR BLD CALC: 28.1 % (ref 36–46)
HEMOGLOBIN: 9.1 G/DL (ref 12–16)
IGA: 197 MG/DL (ref 70–400)
IMMATURE GRANULOCYTES: ABNORMAL %
LYMPHOCYTES # BLD: 39 % (ref 24–44)
MCH RBC QN AUTO: 31.5 PG (ref 26–34)
MCHC RBC AUTO-ENTMCNC: 32.5 G/DL (ref 31–37)
MCV RBC AUTO: 96.8 FL (ref 80–100)
MONOCYTES # BLD: 10 % (ref 1–7)
NRBC AUTOMATED: ABNORMAL PER 100 WBC
PDW BLD-RTO: 13.8 % (ref 11.5–14.9)
PLATELET # BLD: 255 K/UL (ref 150–450)
PLATELET ESTIMATE: ABNORMAL
PMV BLD AUTO: 7.1 FL (ref 6–12)
RBC # BLD: 2.9 M/UL (ref 4–5.2)
RBC # BLD: ABNORMAL 10*6/UL
SEG NEUTROPHILS: 43 % (ref 36–66)
SEGMENTED NEUTROPHILS ABSOLUTE COUNT: 2.8 K/UL (ref 1.3–9.1)
TISSUE TRANSGLUTAMINASE IGA: 18 U/ML
WBC # BLD: 6.4 K/UL (ref 3.5–11)
WBC # BLD: ABNORMAL 10*3/UL

## 2021-06-30 PROCEDURE — 36415 COLL VENOUS BLD VENIPUNCTURE: CPT

## 2021-06-30 PROCEDURE — APPSS15 APP SPLIT SHARED TIME 0-15 MINUTES: Performed by: NURSE PRACTITIONER

## 2021-06-30 PROCEDURE — 6370000000 HC RX 637 (ALT 250 FOR IP)

## 2021-06-30 PROCEDURE — 1240000000 HC EMOTIONAL WELLNESS R&B

## 2021-06-30 PROCEDURE — 97116 GAIT TRAINING THERAPY: CPT

## 2021-06-30 PROCEDURE — 85025 COMPLETE CBC W/AUTO DIFF WBC: CPT

## 2021-06-30 PROCEDURE — 99232 SBSQ HOSP IP/OBS MODERATE 35: CPT | Performed by: PSYCHIATRY & NEUROLOGY

## 2021-06-30 PROCEDURE — 6370000000 HC RX 637 (ALT 250 FOR IP): Performed by: NURSE PRACTITIONER

## 2021-06-30 PROCEDURE — 99232 SBSQ HOSP IP/OBS MODERATE 35: CPT | Performed by: INTERNAL MEDICINE

## 2021-06-30 PROCEDURE — 6370000000 HC RX 637 (ALT 250 FOR IP): Performed by: PSYCHIATRY & NEUROLOGY

## 2021-06-30 PROCEDURE — 97110 THERAPEUTIC EXERCISES: CPT

## 2021-06-30 RX ADMIN — CIPROFLOXACIN 2 DROP: 3 SOLUTION OPHTHALMIC at 14:13

## 2021-06-30 RX ADMIN — OLANZAPINE 5 MG: 5 TABLET, FILM COATED ORAL at 20:34

## 2021-06-30 RX ADMIN — HYDROXYZINE HYDROCHLORIDE 50 MG: 50 TABLET, FILM COATED ORAL at 20:34

## 2021-06-30 RX ADMIN — ALLOPURINOL 100 MG: 100 TABLET ORAL at 08:29

## 2021-06-30 RX ADMIN — Medication 5000 UNITS: at 08:29

## 2021-06-30 RX ADMIN — CIPROFLOXACIN 2 DROP: 3 SOLUTION OPHTHALMIC at 20:34

## 2021-06-30 RX ADMIN — ACETAMINOPHEN 650 MG: 325 TABLET ORAL at 20:34

## 2021-06-30 RX ADMIN — TRAZODONE HYDROCHLORIDE 50 MG: 50 TABLET ORAL at 20:34

## 2021-06-30 RX ADMIN — CIPROFLOXACIN 2 DROP: 3 SOLUTION OPHTHALMIC at 08:29

## 2021-06-30 ASSESSMENT — PAIN SCALES - GENERAL
PAINLEVEL_OUTOF10: 4
PAINLEVEL_OUTOF10: 0

## 2021-06-30 NOTE — PLAN OF CARE
Problem: Falls - Risk of:  Goal: Will remain free from falls  Description: Will remain free from falls  Outcome: Ongoing   Patient remains free from falls, using wheel chair to maneuver around unit  Problem: Altered Mood, Psychotic Behavior:  Goal: Able to verbalize decrease in frequency and intensity of hallucinations  Description: Able to verbalize decrease in frequency and intensity of hallucinations  Outcome: Ongoing   Jaqueline Fallow is seen in the dayroom, affect is flat, she denies depression, some anxiety. Patient is hard of hearing. Awaiting placement, denies any suicidal thoughts, requires 1 assist to restroom.  Sits out in dayroom 15 minute safety checks continue

## 2021-06-30 NOTE — GROUP NOTE
Group Therapy Note    Date: 6/30/2021    Group Start Time: 1100  Group End Time: 5789  Group Topic: Cognitive Skills    LUNA Pavon, CTRS        Group Therapy Note    Attendees: 9         Pt did not attend 1100 cognitive skills group d/t resting in room despite staff invitation to attend. 1:1 talk time offered as alternative to group session, pt declined.       Signature:  Ruth Thakkar

## 2021-06-30 NOTE — PROGRESS NOTES
Daily Progress Note  6/30/2021    Patient Name: Sheela Penaloza    CHIEF COMPLAINT: Acute psychosis         SUBJECTIVE:      Staff reports no overnight events, patient remains medication compliant. She has no significant interval history change, she remains in behavioral control and continues to receive Atarax and trazodone in the evenings to help with sleep. She remains very focused on her inability to return to her home safely. She is poor insight into services available to help her . She is frequently reoriented to the long-term treatment plan with each assessment. She continues to require assistance with any ADL care. Appetite:  [x] Adequate/Unchanged  [] Increased  [] decreased    Sleep:       [x] Adequate/Unchanged  [x] Fair  [] Poor    Group Attendance on Unit:   [] Yes  [] Selectively    [x] No    Medication Side Effects: Denies         Mental Status Exam  Level of consciousness: Awake and alert  Appearance: Casually dressed with fair grooming and fair hygiene   behavior/Motor: Approachable, continues to require assistance with ADLs, [place]  Attitude toward examiner: Cooperative, mostly attentive, good eye contact  Speech: Loud, clear, well articulated  Mood: Anxious  Affect: Congruent, nervous   Thought processes: Perseverates on home environment, redirectable to linear responses  thought content:  Denies homicidal ideation  Suicidal Ideation: Denies suicidal ideations, contracts for safety on the unit. Delusions: Denies  Perceptual Disturbance: does not appear to attend to internal stimuli  Cognition: Oriented to person, place only  memory: impaired   Insight: poor   Judgement: poor      Data   oral temperature is 97.9 °F (36.6 °C). Her blood pressure is 100/50 (abnormal) and her pulse is 67. Her respiration is 14 and oxygen saturation is 100%. Labs:   No results displayed because visit has over 200 results.             Reviewed patient's current plan of care and vital signs with nursing staff. Labs reviewed: [x] Yes    Medications  Current Facility-Administered Medications: OLANZapine (ZYPREXA) tablet 5 mg, 5 mg, Oral, Nightly  ciprofloxacin (CILOXAN) 0.3 % ophthalmic solution 2 drop, 2 drop, Right Eye, Q4H While awake  acetaminophen (TYLENOL) tablet 650 mg, 650 mg, Oral, Q4H PRN  ibuprofen (ADVIL;MOTRIN) tablet 400 mg, 400 mg, Oral, Q6H PRN  polyethylene glycol (GLYCOLAX) packet 17 g, 17 g, Oral, Daily PRN  aluminum & magnesium hydroxide-simethicone (MAALOX) 200-200-20 MG/5ML suspension 30 mL, 30 mL, Oral, Q6H PRN  hydrOXYzine (ATARAX) tablet 50 mg, 50 mg, Oral, TID PRN  traZODone (DESYREL) tablet 50 mg, 50 mg, Oral, Nightly PRN  allopurinol (ZYLOPRIM) tablet 100 mg, 100 mg, Oral, Daily  vitamin D3 (CHOLECALCIFEROL) tablet 5,000 Units, 1 tablet, Oral, Daily    ASSESSMENT  Acute psychosis ([facility] [address]         PLAN  Patient symptoms are: Stable  Continue current medication regimen  Monitor need and frequency of PRN medications. Internal medicine to continue following for medical management  Dietary following for malnutrition  Encourage participation in groups and milieu. Attempt to develop insight. Psycho-education conducted. Supportive Therapy conducted. Probable discharge is per attending physician, PASSR approved. ECF acceptance pending. Guardianship would be beneficial.  She is at a significant risk for decline if she returns home with her , she is high risk for neglect, lack of assess to basic needs (water and electricity) and overall safety. Adult Protective Services will be notified by social work  Follow-up daily while inpatient. Patient continues to be monitored in the inpatient psychiatric facility at [facility] for safety and stabilization. Patient continues to need, on a daily basis, active treatment furnished directly by or requiring the supervision of inpatient psychiatric personnel.     Electronically signed by PEREZ Gupta CNP on 6/30/2021 at 3:20 PM   I gael saw and evaluated the patient. I reviewed the midlevel provider's documentation above. Any additional comments or changes to the midlevel provider's documentation are stated below otherwise agree with assessment. The patient was seen in her room. I have noted that her medical doctor is concerned about celiac disease. I attempted to communicate this with the patient but she did not seem to understand. She continues to be discharged focused      PLAN  No medication changes  Attempt to develop insight  Psycho-education conducted. Supportive Therapy conducted.   Probable discharge is in 1 to 4 days  Follow-up daily while on inpatient unit    Electronically signed by Lambert Quinones MD on 6/30/21 at 5:56 PM EDT

## 2021-06-30 NOTE — PLAN OF CARE
Problem: Altered Mood, Deterioration in Function:  Goal: Able to verbalize reality based thinking  Description: Able to verbalize reality based thinking  6/29/2021 2040 by Yin Marquez RN  Outcome: Ongoing  Note: Patient focused on being discharged and getting home. Patient is worried about not having any clean clothes at home and needing us to provide her with belongings. Problem: Altered Mood, Psychotic Behavior:  Goal: Able to verbalize decrease in frequency and intensity of hallucinations  Description: Able to verbalize decrease in frequency and intensity of hallucinations  Outcome: Ongoing  Note: Patient denies experiencing hallucinations at this time. Patient does not appear to be responding to internal stimuli. Problem: Falls - Risk of:  Goal: Will remain free from falls  Description: Will remain free from falls  6/29/2021 2040 by Yin Marquez RN  Outcome: Ongoing  Note: Pt remains free of falls and verbalizes understanding of individual fall risks. Pt wearing non skid footwear and encouraged to seek out staff for any assistance needed. Patient makes needs known. Patient is a x1 assist.      Problem: Falls - Risk of:  Goal: Absence of physical injury  Description: Absence of physical injury  Outcome: Ongoing  Note: Patient remains free from physical injury.

## 2021-06-30 NOTE — PROGRESS NOTES
Physical Therapy  Facility/Department: [facility]  Daily Treatment Note  NAME: Jessee Jama  : 1945  MRN: [id number]    Date of Service: 2021    Discharge Recommendations:  Patient would benefit from continued therapy after discharge        Assessment   Body structures, Functions, Activity limitations: Decreased functional mobility ; Decreased strength;Decreased safe awareness;Decreased endurance;Decreased cognition;Decreased balance;Decreased posture;Decreased coordination  Treatment Diagnosis: impaired mobility  Specific instructions for Next Treatment: Strengthening/balance ex's  Prognosis: Fair  Barriers to Learning: Cognitive  REQUIRES PT FOLLOW UP: Yes  Activity Tolerance  Activity Tolerance: Patient limited by endurance; Patient limited by fatigue;Patient limited by cognitive status     Patient Diagnosis(es): There were no encounter diagnoses. has a past medical history of Diabetes mellitus ([facility] [address] Hypertension, and Psychiatric problem. has no past surgical history on file. Restrictions  Restrictions/Precautions  Restrictions/Precautions: Fall Risk  Subjective   Subjective  Subjective: Writer called [facility] to check to see if pt was available for therapy. Writer told that pt was in Dayroom watching t.v. Writer arrived 10 mins later and pt had returned to bed Pt agreeable to therapy. General Comment  Comments: Pt is coopertive, required more time to perform/understand command. (Not sure how much pt understand.)  Pain Assessment  Pain Assessment: 0-10  Pain Level: 0  Non-Pharmaceutical Pain Intervention(s): Ambulation/Increased Activity  Response to Pain Intervention: Patient Satisfied  Oxygen Therapy  O2 Device: None (Room air)  Patient Observation  Observations: Tremors noted R UE       Orientation  Orientation  Overall Orientation Status: Impaired  Orientation Level: Oriented to person;Disoriented to place; Disoriented to time;Disoriented to situation  Cognition      Objective   Bed mobility  Rolling to Right: Minimal assistance  Supine to Sit: Minimal assistance  Sit to Supine: Minimal assistance  Scooting: Minimal assistance (Cues for hand placement)  Transfers  Sit to Stand: Minimal Assistance; Moderate Assistance (min to mod difficulty following cues)  Stand to sit: Minimal Assistance  Bed to Chair: Minimal assistance; Moderate assistance  Comment: Standing with RW  Ambulation  Ambulation?: Yes  Ambulation 1  Surface: level tile  Device: Rolling Walker  Other Apparatus: Wheelchair follow (Therapist pulling w/c behind as pt walks.)  Assistance: Minimal assistance  Quality of Gait: Decreased step length  Gait Deviations: Decreased step length;Decreased step height;Slow Adriana (NBOS)  Distance: 40' x 2; with 1 seated rest break in w/c. Edel Sweeney Balance  Posture: Fair  Sitting - Static: Good;-  Sitting - Dynamic: Fair  Standing - Static: Fair;+ (RW)  Standing - Dynamic: Fair;- (RW)  Other exercises  Other exercises?: Yes  Other exercises 1: Ther ex both L/E seated x 10  Other exercises 2: Ther ex both L/E standing x 5 (Pt easily fatigued)   AROM RLE (degrees)  RLE AROM: WFL  AROM LLE (degrees)  LLE AROM : WFL     Other Activities: Other (see comment) (Sit<>Stand x 3, strong cues for hand placement)              G-Code     OutComes Score                                                     AM-PAC Score             Goals  Short term goals  Time Frame for Short term goals: 8 visits  Short term goal 1: Pt able to perform supine<.sit CGA  Short term goal 2:  pt able to transfer at [facility] 346 term goal 3: Pt able to [facility] with rolling walker distance of 100 ft x 2, CGA, with mod cues topick up legs and widen JOSUE. Short term goal 4: Pt to participate in balance/strengthening ex's to improve fucntion. Patient Goals   Patient goals : Can I go home? .    Plan    Plan  Times per week: 3 x/week  Specific instructions for Next Treatment: Strengthening/balance ex's  Safety Devices  Type of devices: Call light within reach, Left in bed, All fall risk precautions in place     Therapy Time   Individual Concurrent Group Co-treatment   Time In 0915         Time Out 0942         Minutes [address] [place] [address]   Electronically signed by Angie Barcenas PTA on 6/30/2021 at 12:58 PM

## 2021-06-30 NOTE — CONSULTS
[facility] Internal Medicine    CONSULTATION / HISTORY AND PHYSICAL EXAMINATION            Date:   6/30/2021  Patient name:  Tomy Solis  Date of admission:  6/18/2021  2:25 AM  MRN:   [id number]  Account:  [de-identified]  YOB: 1945  PCP:    No primary care provider on file. Room:   [address] [place] [address]  Code Status:    Full Code    Physician Requesting Consult: Angie Galvan MD    Reason for Consult:  medical management    Chief Complaint:     No chief complaint on file. Medical management    History Obtained From:     Patient medical record nursing staff    History of Present Illness:     Patient is uncooperative unable to get history of presenting illness mostly noted from nursing staff and medical records history of diabetes and hypertension not on any medication no hypoglycemia noted elderly lady with a false dentures and deconditioning    Patient sitting out normal conversation hard of hearing    Past Medical History:     Past Medical History:   Diagnosis Date    Diabetes mellitus ([facility] [address]     Hypertension     Psychiatric problem         Past Surgical History:     History reviewed. No pertinent surgical history. Medications Prior to Admission:     Prior to Admission medications    Medication Sig Start Date End Date Taking?  Authorizing Provider   allopurinol (ZYLOPRIM) 100 MG tablet Take 100 mg by mouth daily    Historical Provider, MD   amLODIPine (NORVASC) 5 MG tablet Take 5 mg by mouth daily    Historical Provider, MD   Cholecalciferol (VITAMIN D3) 1.25 MG (28709 UT) CAPS Take by mouth    Historical Provider, MD   divalproex (DEPAKOTE ER) 500 MG extended release tablet Take 500 mg by mouth daily    Historical Provider, MD   metFORMIN (GLUCOPHAGE) 500 MG tablet Take 500 mg by mouth 2 times daily (with meals)    Historical Provider, MD   OLANZapine (ZYPREXA) 2.5 MG tablet Take 2.5 mg by mouth nightly    Historical Provider, MD   PARoxetine (PAXIL) 20 MG tablet Take 20 mg by mouth every morning    Historical Provider, MD        Allergies:     Patient has no known allergies. Social History:     Tobacco:    reports that she has never smoked. She has never used smokeless tobacco.  Alcohol:      has no history on file for alcohol use. Drug Use:  has no history on file for drug use. Family History:     History reviewed. No pertinent family history. Review of Systems:     Hard of hearing unable to get good review of system       Physical Exam:     BP (!) 100/50   Pulse 67   Temp 97.9 °F (36.6 °C) (Oral)   Resp 14   SpO2 100%   Temp (24hrs), Av °F (36.7 °C), Min:97.9 °F (36.6 °C), Max:98.1 °F (36.7 °C)    No results for input(s): POCGLU in the last 72 hours. No intake or output data in the 24 hours ending 21 1620    General Appearance:  alert, well appearing, and in no acute distress  Mental status: oriented to person, place, and time with normal affect  Head:  normocephalic, atraumatic. Eye: no icterus, redness, pupils equal and reactive, extraocular eye movements intact, conjunctiva clear  Ear: normal external ear, no discharge, hearing intact  Nose:  no drainage noted  Mouth: mucous membranes moist  Dentures noted in the mouth  Neck: supple, no carotid bruits, thyroid not palpable  Lungs: Bilateral equal air entry, clear to ausculation, no wheezing, rales or rhonchi, normal effort  Cardiovascular: normal rate, regular rhythm, no murmur, gallop, rub.   Abdomen: Soft, nontender, nondistended, normal bowel sounds, no hepatomegaly or splenomegaly  Neurologic: Unable to do neuro exam no focal deficit skin: No gross lesions, rashes, bruising or bleeding on exposed skin area  Extremities:  peripheral pulses palpable, no pedal edema or calf pain with palpation      Investigations:      Laboratory Testing:  Recent Results (from the past 24 hour(s))   CBC Auto Differential    Collection Time: 21  8:04 AM   Result Value Ref Range    WBC 6.4 3.5 - 11.0 k/uL RBC 2.90 (L) 4.0 - 5.2 m/uL    Hemoglobin 9.1 (L) 12.0 - 16.0 g/dL    Hematocrit 28.1 (L) 36 - 46 %    MCV 96.8 80 - 100 fL    MCH 31.5 26 - 34 pg    MCHC 32.5 31 - 37 g/dL    RDW 13.8 11.5 - 14.9 %    Platelets 091 796 - 307 k/uL    MPV 7.1 6.0 - 12.0 fL    NRBC Automated NOT REPORTED per 100 WBC    Differential Type NOT REPORTED     Seg Neutrophils 43 36 - 66 %    Lymphocytes 39 24 - 44 %    Monocytes 10 (H) 1 - 7 %    Eosinophils % 7 (H) 0 - 4 %    Basophils 1 0 - 2 %    Immature Granulocytes NOT REPORTED 0 %    Segs Absolute 2.80 1.3 - 9.1 k/uL    Absolute Lymph # 2.50 1.0 - 4.8 k/uL    Absolute Mono # 0.60 0.1 - 1.3 k/uL    Absolute Eos # 0.50 (H) 0.0 - 0.4 k/uL    Basophils Absolute 0.10 0.0 - 0.2 k/uL    Absolute Immature Granulocyte NOT REPORTED 0.00 - 0.30 k/uL    WBC Morphology NOT REPORTED     RBC Morphology NOT REPORTED     Platelet Estimate NOT REPORTED            Consultations:   IP CONSULT TO INTERNAL MEDICINE  IP CONSULT TO INTERNAL MEDICINE  IP CONSULT TO DIETITIAN  IP CONSULT TO INTERNAL MEDICINE  IP CONSULT TO GI  IP CONSULT TO DIETITIAN  Assessment :      Primary Problem  Acute psychosis ([facility] [address]    Active Hospital Problems    Diagnosis Date Noted    Severe malnutrition ([facility] [address] [E43] 06/18/2021    Acute psychosis ([facility] [address] [F23] 06/17/2021       Plan:     Patient is hard of hearing limited conversation reconsult for anemia hemoglobin 9 was 12 no active bleeding microcytic MCV 99  Will test for vitamin X60 folic acid celiac disease  June 30  Celiac disease positive GI consult for upper endoscopy and biopsy  Dietary consult for gluten-free diet    Chani Frazier MD  6/30/2021  4:20 PM    Copy sent to Dr. Aranda Artist primary care provider on file. Please note that this chart was generated using voice recognition Dragon dictation software. Although every effort was made to ensure the accuracy of this automated transcription, some errors in transcription may have occurred.

## 2021-07-01 LAB
ABSOLUTE EOS #: 0.4 K/UL (ref 0–0.4)
ABSOLUTE IMMATURE GRANULOCYTE: ABNORMAL K/UL (ref 0–0.3)
ABSOLUTE LYMPH #: 2.4 K/UL (ref 1–4.8)
ABSOLUTE MONO #: 0.7 K/UL (ref 0.1–1.3)
BASOPHILS # BLD: 1 % (ref 0–2)
BASOPHILS ABSOLUTE: 0 K/UL (ref 0–0.2)
DIFFERENTIAL TYPE: ABNORMAL
EOSINOPHILS RELATIVE PERCENT: 7 % (ref 0–4)
HCT VFR BLD CALC: 25.1 % (ref 36–46)
HEMOGLOBIN: 8.2 G/DL (ref 12–16)
IMMATURE GRANULOCYTES: ABNORMAL %
LYMPHOCYTES # BLD: 37 % (ref 24–44)
MCH RBC QN AUTO: 31.5 PG (ref 26–34)
MCHC RBC AUTO-ENTMCNC: 32.7 G/DL (ref 31–37)
MCV RBC AUTO: 96.3 FL (ref 80–100)
MONOCYTES # BLD: 10 % (ref 1–7)
NRBC AUTOMATED: ABNORMAL PER 100 WBC
PDW BLD-RTO: 13.8 % (ref 11.5–14.9)
PLATELET # BLD: 256 K/UL (ref 150–450)
PLATELET ESTIMATE: ABNORMAL
PMV BLD AUTO: 7.2 FL (ref 6–12)
RBC # BLD: 2.6 M/UL (ref 4–5.2)
RBC # BLD: ABNORMAL 10*6/UL
SEG NEUTROPHILS: 45 % (ref 36–66)
SEGMENTED NEUTROPHILS ABSOLUTE COUNT: 3 K/UL (ref 1.3–9.1)
WBC # BLD: 6.5 K/UL (ref 3.5–11)
WBC # BLD: ABNORMAL 10*3/UL

## 2021-07-01 PROCEDURE — 6370000000 HC RX 637 (ALT 250 FOR IP): Performed by: NURSE PRACTITIONER

## 2021-07-01 PROCEDURE — 36415 COLL VENOUS BLD VENIPUNCTURE: CPT

## 2021-07-01 PROCEDURE — 6370000000 HC RX 637 (ALT 250 FOR IP): Performed by: PSYCHIATRY & NEUROLOGY

## 2021-07-01 PROCEDURE — APPSS45 APP SPLIT SHARED TIME 31-45 MINUTES: Performed by: PSYCHIATRY & NEUROLOGY

## 2021-07-01 PROCEDURE — 85025 COMPLETE CBC W/AUTO DIFF WBC: CPT

## 2021-07-01 PROCEDURE — 99222 1ST HOSP IP/OBS MODERATE 55: CPT | Performed by: INTERNAL MEDICINE

## 2021-07-01 PROCEDURE — 99232 SBSQ HOSP IP/OBS MODERATE 35: CPT | Performed by: PSYCHIATRY & NEUROLOGY

## 2021-07-01 PROCEDURE — 1240000000 HC EMOTIONAL WELLNESS R&B

## 2021-07-01 PROCEDURE — 6370000000 HC RX 637 (ALT 250 FOR IP)

## 2021-07-01 RX ADMIN — HYDROXYZINE HYDROCHLORIDE 50 MG: 50 TABLET, FILM COATED ORAL at 20:20

## 2021-07-01 RX ADMIN — TRAZODONE HYDROCHLORIDE 50 MG: 50 TABLET ORAL at 20:20

## 2021-07-01 RX ADMIN — ALLOPURINOL 100 MG: 100 TABLET ORAL at 08:25

## 2021-07-01 RX ADMIN — Medication 5000 UNITS: at 08:25

## 2021-07-01 RX ADMIN — OLANZAPINE 5 MG: 5 TABLET, FILM COATED ORAL at 20:20

## 2021-07-01 RX ADMIN — ACETAMINOPHEN 650 MG: 325 TABLET ORAL at 20:20

## 2021-07-01 RX ADMIN — CIPROFLOXACIN 2 DROP: 3 SOLUTION OPHTHALMIC at 17:18

## 2021-07-01 RX ADMIN — CIPROFLOXACIN 2 DROP: 3 SOLUTION OPHTHALMIC at 13:41

## 2021-07-01 RX ADMIN — CIPROFLOXACIN 2 DROP: 3 SOLUTION OPHTHALMIC at 08:26

## 2021-07-01 ASSESSMENT — PAIN SCALES - GENERAL: PAINLEVEL_OUTOF10: 2

## 2021-07-01 NOTE — GROUP NOTE
Group Therapy Note    Date: 7/1/2021    Group Start Time: 0900  Group End Time: 0930  Group Topic: Community Meeting    LUNA Bay, RACHAELS    Pt did not attend 0900 community meeting/goal setting group d/t resting in room despite staff invitation to attend. 1:1 talk time offered as alternative to group session, pt declined.               Signature:  Lyndon Dangelo

## 2021-07-01 NOTE — PLAN OF CARE
Nutrition Problem #1: Severe malnutrition  Intervention: Food and/or Nutrient Delivery: Modify Current Diet, Continue Oral Nutrition Supplement  Nutritional Goals: Meet greater than 75% of estimated nutrition needs

## 2021-07-01 NOTE — CONSULTS
Comprehensive Nutrition Assessment    Type and Reason for Visit:  Consult    Nutrition Recommendations/Plan: Provide Gluten Free, Easy to Chew Diet. Continue oral nutrition supplements. Nutrition Assessment:  Nurse states pt ate well at breakfast. Consult has been ordered for GI consult with recommendation for EGD with biospy to confirm Celiac Diese. Consult to RD for Gluten Free Diet education. Nurse states he will review basics of diet with pt and RD education with pt currently not appropriate. Gluten Free added to diet order. Note plan for ECF at time of discharge. If Celiac disease is ruled out, recommend discontinuation of gluten restriction. Malnutrition Assessment:  Malnutrition Status:  Severe malnutrition    Context:  Acute Illness     Findings of the 6 clinical characteristics of malnutrition:  Energy Intake:  7 - 50% or less of estimated energy requirements for 5 or more days  Weight Loss:  7 - Greater than 5% over 1 month     Body Fat Loss:  7 - Moderate body fat loss Orbital, Triceps   Muscle Mass Loss:  7 - Moderate muscle mass loss Temples (temporalis), Clavicles (pectoralis & deltoids)  Fluid Accumulation:  No significant fluid accumulation     Strength:  Not Performed      Nutrition Related Findings:  No edema. Gliadin Deaminidated Peptide AB IGA: 56.0 (6/28); Hgb: 8.2, Hct: 25.1 (7/1). Other labs and meds reviewed.       Wounds:  None       Current Nutrition Therapies:    Adult Oral Nutrition Supplement; Standard High Calorie/High Protein Oral Supplement  Adult Oral Nutrition Supplement; Clear Liquid Oral Supplement  Adult Oral Nutrition Supplement; Frozen Oral Supplement  ADULT DIET; Easy to Chew; Gluten Free    Anthropometric Measures:  · Height:  (unknown by patient)  · Current Body Weight:   Not recorded     Nutrition Diagnosis:   · Severe malnutrition related to inadequate protein-energy intake, psychological cause or life stress as evidenced by poor intake prior to admission,

## 2021-07-01 NOTE — PROGRESS NOTES
Daily Progress Note  7/1/2021    Patient Name: Cindy Hubbard    CHIEF COMPLAINT: Acute psychosis         SUBJECTIVE:  . Hue Arellano has no significant interval history change, she remains in behavioral control and continues to receive Atarax and trazodone in the evenings to help with sleep. She remains very focused on her physical limitations and her inability to clean her house or do laundry due to plumbing concerns. She is very focused on the inability of her  to care for her due to his diabetes and has concerns that she will not receive the support required. Attempted to provide patient information related to celiac disease however she shares that she is unable to read [place] and requires literature [place] only. Team will continue to search for appropriate educational materials related to recent diagnosis. Patient did verbalize and understanding when provided in her left ear at very large volume. She is more logical and appears to have insight into her substantial weight loss, dietary distress and diagnosis provided. EGD procedure is explained and patient is agreeable to proceed with additional consults as required. She is gracious and grateful for team for their assistance in toileting and showering. She understands her physical limitations and the plan will be to transition to extended care facility with goal of increasing her strength and resilience. She continues to require assistance with any ADL care.     Appetite:  [x] Adequate/Unchanged  [] Increased  [] decreased    Sleep:       [x] Adequate/Unchanged  [x] Fair  [] Poor    Group Attendance on Unit:   [] Yes  [] Selectively    [x] No-however she enjoys sitting on the milieu watching television or looking out of the window    Medication Side Effects: Denies         Mental Status Exam  Level of consciousness: Awake and alert  Appearance: Casually dressed with fair grooming and fair hygiene   behavior/Motor: Approachable, continues to require assistance with ADLs, [place] right hand tremor present, patient states able to control with concentration and focus on movement  Attitude toward examiner: Cooperative, mostly attentive, good eye contact  Speech: Loud, clear, well articulated  Mood: Anxious  Affect: Congruent, nervous   Thought processes: Perseverates on home environment, redirectable to linear responses  thought content:  Denies homicidal ideation  Suicidal Ideation: Denies suicidal ideations, contracts for safety on the unit. Delusions: Denies  Perceptual Disturbance: does not appear to attend to internal stimuli  Cognition: Oriented to person, place only  memory: impaired   Insight: poor   Judgement: poor      Data   oral temperature is 98.1 °F (36.7 °C). Her blood pressure is 105/55 (abnormal) and her pulse is 56. Her respiration is 16 and oxygen saturation is 100%. Labs:   No results displayed because visit has over 200 results. Reviewed patient's current plan of care and vital signs with nursing staff. Labs reviewed: [x] Yes    Medications  Current Facility-Administered Medications: OLANZapine (ZYPREXA) tablet 5 mg, 5 mg, Oral, Nightly  ciprofloxacin (CILOXAN) 0.3 % ophthalmic solution 2 drop, 2 drop, Right Eye, Q4H While awake  acetaminophen (TYLENOL) tablet 650 mg, 650 mg, Oral, Q4H PRN  ibuprofen (ADVIL;MOTRIN) tablet 400 mg, 400 mg, Oral, Q6H PRN  polyethylene glycol (GLYCOLAX) packet 17 g, 17 g, Oral, Daily PRN  aluminum & magnesium hydroxide-simethicone (MAALOX) 200-200-20 MG/5ML suspension 30 mL, 30 mL, Oral, Q6H PRN  hydrOXYzine (ATARAX) tablet 50 mg, 50 mg, Oral, TID PRN  traZODone (DESYREL) tablet 50 mg, 50 mg, Oral, Nightly PRN  allopurinol (ZYLOPRIM) tablet 100 mg, 100 mg, Oral, Daily  vitamin D3 (CHOLECALCIFEROL) tablet 5,000 Units, 1 tablet, Oral, Daily    ASSESSMENT  Acute psychosis ([facility] [address]         PLAN  Patient symptoms are: Stable  Continue current medication regimen  Monitor need and frequency of PRN medications. Internal medicine to continue following for medical management  Dietary following for malnutrition  Encourage participation in groups and milieu. Attempt to develop insight. Psycho-education conducted. Supportive Therapy conducted. Probable discharge is per attending physician, PASSR approved. ECF acceptance pending. Guardianship would be beneficial.  She is at a significant risk for decline if she returns home with her , she is high risk for neglect, lack of assess to basic needs (water and electricity) and overall safety. Adult Protective Services will be notified by social work  Follow-up daily while inpatient. Patient continues to be monitored in the inpatient psychiatric facility at [facility] for safety and stabilization. Patient continues to need, on a daily basis, active treatment furnished directly by or requiring the supervision of inpatient psychiatric personnel. Electronically signed by PEREZ Vasquez CNP on 7/1/2021 at 5:51 PM   I independently saw and evaluated the patient. I reviewed the midlevel provider's documentation above. Any additional comments or changes to the midlevel provider's documentation are stated below otherwise agree with assessment. The patient could not engage with GI physician due to communication difficulties and hearing problems. The patient is able to communicate with the use of some aids as noted above      PLAN  Medications as noted above  Attempt to develop insight  Psycho-education conducted. Supportive Therapy conducted.   Probable discharge is in 2 to 3 days  Follow-up daily while on inpatient unit    Electronically signed by Ash Saldaña MD on 7/1/21 at 5:59 PM EDT

## 2021-07-01 NOTE — BH NOTE
GI medical doctor arrived on unit for patient consult approximately at 1315. Writer explained to MD that patient is hard of hearing and is her own guardian. MD attempted to ask patient orientation questions but she was unable to hear him. MD decided patient is unable to communicate and is totally disoriented. Writer explain to MD that patient oriented to self and place. Writer also explain to MD that patient is her own guardian and can make decisions by herself. MD said patient is not able to consent for treatment.

## 2021-07-01 NOTE — BH NOTE
Writer spoke with dietician over the phone in regards to new diet. Dietician was informed by internal medicine to put patient on gluten free diet order for possible celiac disease. Note from internal medicine says \"Celiac disease positive\" GI consult for upper endoscopy and biopsy.

## 2021-07-01 NOTE — CONSULTS
release tablet Take 500 mg by mouth daily    Historical Provider, MD   metFORMIN (GLUCOPHAGE) 500 MG tablet Take 500 mg by mouth 2 times daily (with meals)    Historical Provider, MD   OLANZapine (ZYPREXA) 2.5 MG tablet Take 2.5 mg by mouth nightly    Historical Provider, MD   PARoxetine (PAXIL) 20 MG tablet Take 20 mg by mouth every morning    Historical Provider, MD        Allergies:       Patient has no known allergies. Social History:     Tobacco:    reports that she has never smoked. She has never used smokeless tobacco.  Alcohol:      has no history on file for alcohol use. Drug Use:  has no history on file for drug use. Family History:     History reviewed. No pertinent family history. Review of Systems:     Positive and Negative as described in HPI      Code Status:  Full Code    Physical Exam:     Vitals:  BP (!) 105/55   Pulse 56   Temp 98.1 °F (36.7 °C) (Oral)   Resp 16   SpO2 100%   Temp (24hrs), Av.8 °F (36.6 °C), Min:97.5 °F (36.4 °C), Max:98.1 °F (36.7 °C)      General appearance - alert, malnourished appearing, and in no acute distress  Mental status - disoriented to person, place, and time with anxious affect  Head - normocephalic and atraumatic  Eyes - pupils equal and reactive, extraocular eye movements intact, conjunctiva clear  Ears - hearing appears to be intact  Nose - no drainage noted  Mouth - mucous membranes moist  Neck - supple, no carotid bruits, thyroid not palpable  Chest - clear to auscultation, normal effort  Heart - normal rate, regular rhythm, no murmurs  Abdomen - soft, bloated mildly tenderness, nondistended, bowel sounds present all four quadrants, no masses, hepatomegaly or splenomegaly.  No hernias  Neurological -disoriented speech all she is asking has when she can go home   extremities , no pedal edema or calf pain with palpation  Skin - no gross lesions, rashes, or induration noted  Cranial Nerves : Not done  Lymph nodes: not done at this time    Data: CBC:   Lab Results   Component Value Date    WBC 6.5 07/01/2021    RBC 2.60 07/01/2021    HGB 8.2 07/01/2021    HCT 25.1 07/01/2021    MCV 96.3 07/01/2021    MCH 31.5 07/01/2021    MCHC 32.7 07/01/2021    RDW 13.8 07/01/2021     07/01/2021    MPV 7.2 07/01/2021     CBC with Differential:    Lab Results   Component Value Date    WBC 6.5 07/01/2021    RBC 2.60 07/01/2021    HGB 8.2 07/01/2021    HCT 25.1 07/01/2021     07/01/2021    MCV 96.3 07/01/2021    MCH 31.5 07/01/2021    MCHC 32.7 07/01/2021    RDW 13.8 07/01/2021    LYMPHOPCT 37 07/01/2021    MONOPCT 10 07/01/2021    BASOPCT 1 07/01/2021    MONOSABS 0.70 07/01/2021    LYMPHSABS 2.40 07/01/2021    EOSABS 0.40 07/01/2021    BASOSABS 0.00 07/01/2021    DIFFTYPE NOT REPORTED 07/01/2021     Hemoglobin/Hematocrit:    Lab Results   Component Value Date    HGB 8.2 07/01/2021    HCT 25.1 07/01/2021     CMP:    Lab Results   Component Value Date     06/18/2021    K 4.7 06/18/2021     06/18/2021    CO2 22 06/18/2021    BUN 13 06/18/2021    CREATININE 0.94 06/18/2021    GFRAA >60 06/18/2021    LABGLOM 58 06/18/2021    GLUCOSE 95 06/18/2021    PROT 6.9 06/18/2021    LABALBU 4.1 06/18/2021    CALCIUM 9.4 06/18/2021    BILITOT 0.38 06/18/2021    ALKPHOS 74 06/18/2021    AST 14 06/18/2021    ALT <5 06/18/2021     BMP:    Lab Results   Component Value Date     06/18/2021    K 4.7 06/18/2021     06/18/2021    CO2 22 06/18/2021    BUN 13 06/18/2021    LABALBU 4.1 06/18/2021    CREATININE 0.94 06/18/2021    CALCIUM 9.4 06/18/2021    GFRAA >60 06/18/2021    LABGLOM 58 06/18/2021    GLUCOSE 95 06/18/2021     PT/INR:  No results found for: PROTIME, INR  PTT:  No results found for: APTT, PTT[APTT}    Assesment:     Primary Problem  Acute psychosis [facility])    Active Hospital Problems    Diagnosis Date Noted    Severe malnutrition ([facility] [address] [E43] 06/18/2021    Acute psychosis ([facility] [address] [F23] 06/17/2021     Positive celiac serology  Abdominal discomfort  Malnutrition  Psychosis  Plan:     1. Strict gluten-free diet  2. Dietitian evaluation  3. Check stool for occult blood  4. Follow-up hemoglobin hematocrit  5. She will require EGD with biopsies of the small bowel  6. At this point there is no family or legal guardian available to give consent  7. This was discussed with nursing staff and psych unit  8. We will follow up accordingly        Thank you for allowing me to participate in the care of your patient. Please feel free to contact me with any questions or concerns. Electronically signed by Monica Barnett MD on 7/1/2021 at 1:19 PM     Copy sent to Dr. Rodriguez primary care provider on file.

## 2021-07-01 NOTE — PLAN OF CARE
Problem: Altered Mood, Deterioration in Function:  Goal: Able to verbalize reality based thinking  Description: Able to verbalize reality based thinking  Outcome: Ongoing  Note: Patient focused on going home. Patient worried about getting her belongings and getting clean clothes. Patient states she has no clean clothes at home. Problem: Altered Mood, Psychotic Behavior:  Goal: Able to verbalize decrease in frequency and intensity of hallucinations  Description: Able to verbalize decrease in frequency and intensity of hallucinations  6/30/2021 2257 by Marcio Jha RN  Outcome: Ongoing  Note: Patient denies experiencing hallucinations at this time. Problem: Falls - Risk of:  Goal: Will remain free from falls  Description: Will remain free from falls  6/30/2021 2257 by Marcio Jha RN  Outcome: Ongoing  Note: Pt remains free of falls and verbalizes understanding of individual fall risks. Pt wearing non skid footwear and encouraged to seek out staff for any assistance needed.

## 2021-07-02 LAB
ABSOLUTE EOS #: 0.4 K/UL (ref 0–0.4)
ABSOLUTE IMMATURE GRANULOCYTE: ABNORMAL K/UL (ref 0–0.3)
ABSOLUTE LYMPH #: 2.3 K/UL (ref 1–4.8)
ABSOLUTE MONO #: 0.6 K/UL (ref 0.1–1.3)
BASOPHILS # BLD: 1 % (ref 0–2)
BASOPHILS ABSOLUTE: 0 K/UL (ref 0–0.2)
DIFFERENTIAL TYPE: ABNORMAL
EOSINOPHILS RELATIVE PERCENT: 7 % (ref 0–4)
HCT VFR BLD CALC: 26.3 % (ref 36–46)
HEMOGLOBIN: 8.6 G/DL (ref 12–16)
IMMATURE GRANULOCYTES: ABNORMAL %
LYMPHOCYTES # BLD: 40 % (ref 24–44)
MCH RBC QN AUTO: 31.5 PG (ref 26–34)
MCHC RBC AUTO-ENTMCNC: 32.9 G/DL (ref 31–37)
MCV RBC AUTO: 95.8 FL (ref 80–100)
MONOCYTES # BLD: 10 % (ref 1–7)
NRBC AUTOMATED: ABNORMAL PER 100 WBC
PDW BLD-RTO: 14.1 % (ref 11.5–14.9)
PLATELET # BLD: 275 K/UL (ref 150–450)
PLATELET ESTIMATE: ABNORMAL
PMV BLD AUTO: 7 FL (ref 6–12)
RBC # BLD: 2.74 M/UL (ref 4–5.2)
RBC # BLD: ABNORMAL 10*6/UL
SEG NEUTROPHILS: 42 % (ref 36–66)
SEGMENTED NEUTROPHILS ABSOLUTE COUNT: 2.5 K/UL (ref 1.3–9.1)
WBC # BLD: 5.8 K/UL (ref 3.5–11)
WBC # BLD: ABNORMAL 10*3/UL

## 2021-07-02 PROCEDURE — 6370000000 HC RX 637 (ALT 250 FOR IP): Performed by: NURSE PRACTITIONER

## 2021-07-02 PROCEDURE — APPSS30 APP SPLIT SHARED TIME 16-30 MINUTES: Performed by: PSYCHIATRY & NEUROLOGY

## 2021-07-02 PROCEDURE — 6370000000 HC RX 637 (ALT 250 FOR IP): Performed by: PSYCHIATRY & NEUROLOGY

## 2021-07-02 PROCEDURE — 6370000000 HC RX 637 (ALT 250 FOR IP)

## 2021-07-02 PROCEDURE — 99232 SBSQ HOSP IP/OBS MODERATE 35: CPT | Performed by: PSYCHIATRY & NEUROLOGY

## 2021-07-02 PROCEDURE — 1240000000 HC EMOTIONAL WELLNESS R&B

## 2021-07-02 PROCEDURE — 99232 SBSQ HOSP IP/OBS MODERATE 35: CPT | Performed by: INTERNAL MEDICINE

## 2021-07-02 PROCEDURE — 85025 COMPLETE CBC W/AUTO DIFF WBC: CPT

## 2021-07-02 PROCEDURE — 6370000000 HC RX 637 (ALT 250 FOR IP): Performed by: INTERNAL MEDICINE

## 2021-07-02 PROCEDURE — 36415 COLL VENOUS BLD VENIPUNCTURE: CPT

## 2021-07-02 RX ORDER — M-VIT,TX,IRON,MINS/CALC/FOLIC 27MG-0.4MG
1 TABLET ORAL 2 TIMES DAILY
Status: DISCONTINUED | OUTPATIENT
Start: 2021-07-02 | End: 2021-07-06 | Stop reason: HOSPADM

## 2021-07-02 RX ADMIN — ALLOPURINOL 100 MG: 100 TABLET ORAL at 08:34

## 2021-07-02 RX ADMIN — ACETAMINOPHEN 650 MG: 325 TABLET ORAL at 20:11

## 2021-07-02 RX ADMIN — CIPROFLOXACIN 2 DROP: 3 SOLUTION OPHTHALMIC at 17:31

## 2021-07-02 RX ADMIN — CIPROFLOXACIN 2 DROP: 3 SOLUTION OPHTHALMIC at 15:36

## 2021-07-02 RX ADMIN — OLANZAPINE 5 MG: 5 TABLET, FILM COATED ORAL at 20:11

## 2021-07-02 RX ADMIN — Medication 5000 UNITS: at 08:34

## 2021-07-02 RX ADMIN — TRAZODONE HYDROCHLORIDE 50 MG: 50 TABLET ORAL at 20:11

## 2021-07-02 RX ADMIN — HYDROXYZINE HYDROCHLORIDE 50 MG: 50 TABLET, FILM COATED ORAL at 20:11

## 2021-07-02 RX ADMIN — MULTIPLE VITAMINS W/ MINERALS TAB 1 TABLET: TAB at 15:49

## 2021-07-02 RX ADMIN — CIPROFLOXACIN 2 DROP: 3 SOLUTION OPHTHALMIC at 08:34

## 2021-07-02 ASSESSMENT — PAIN SCALES - GENERAL
PAINLEVEL_OUTOF10: 0
PAINLEVEL_OUTOF10: 1

## 2021-07-02 NOTE — CONSULTS
[facility] hospital Internal Medicine    CONSULTATION / HISTORY AND PHYSICAL EXAMINATION            Date:   7/2/2021  Patient name:  Andrez Maravilla  Date of admission:  6/18/2021  2:25 AM  MRN:   [id number]  Account:  [de-identified]  YOB: 1945  PCP:    No primary care provider on file. Room:   [address] [place] [address]  Code Status:    Full Code    Physician Requesting Consult: Lisha Isaac MD    Reason for Consult:  medical management    Chief Complaint:     No chief complaint on file. Medical management    History Obtained From:     Patient medical record nursing staff    History of Present Illness:     Patient is uncooperative unable to get history of presenting illness mostly noted from nursing staff and medical records history of diabetes and hypertension not on any medication no hypoglycemia noted elderly lady with a false dentures and deconditioning    Patient sitting out normal conversation hard of hearing    Past Medical History:     Past Medical History:   Diagnosis Date    Diabetes mellitus ([facility] [address]     Hypertension     Psychiatric problem         Past Surgical History:     History reviewed. No pertinent surgical history. Medications Prior to Admission:     Prior to Admission medications    Medication Sig Start Date End Date Taking?  Authorizing Provider   allopurinol (ZYLOPRIM) 100 MG tablet Take 100 mg by mouth daily    Historical Provider, MD   amLODIPine (NORVASC) 5 MG tablet Take 5 mg by mouth daily    Historical Provider, MD   Cholecalciferol (VITAMIN D3) 1.25 MG (88430 UT) CAPS Take by mouth    Historical Provider, MD   divalproex (DEPAKOTE ER) 500 MG extended release tablet Take 500 mg by mouth daily    Historical Provider, MD   metFORMIN (GLUCOPHAGE) 500 MG tablet Take 500 mg by mouth 2 times daily (with meals)    Historical Provider, MD   OLANZapine (ZYPREXA) 2.5 MG tablet Take 2.5 mg by mouth nightly    Historical Provider, MD   PARoxetine (PAXIL) 20 MG tablet Take 20 mg by mouth every morning    Historical Provider, MD        Allergies:     Patient has no known allergies. Social History:     Tobacco:    reports that she has never smoked. She has never used smokeless tobacco.  Alcohol:      has no history on file for alcohol use. Drug Use:  has no history on file for drug use. Family History:     History reviewed. No pertinent family history. Review of Systems:     Hard of hearing unable to get good review of system       Physical Exam:     /78   Pulse 133   Temp 98.2 °F (36.8 °C) (Oral)   Resp 14   SpO2 100%   Temp (24hrs), Av.3 °F (36.8 °C), Min:98.2 °F (36.8 °C), Max:98.3 °F (36.8 °C)    No results for input(s): POCGLU in the last 72 hours. No intake or output data in the 24 hours ending 21 1631    General Appearance:  alert, well appearing, and in no acute distress  Mental status: oriented to person, place, and time with normal affect  Head:  normocephalic, atraumatic. Eye: no icterus, redness, pupils equal and reactive, extraocular eye movements intact, conjunctiva clear  Ear: normal external ear, no discharge, hearing intact  Nose:  no drainage noted  Mouth: mucous membranes moist  Dentures noted in the mouth  Neck: supple, no carotid bruits, thyroid not palpable  Lungs: Bilateral equal air entry, clear to ausculation, no wheezing, rales or rhonchi, normal effort  Cardiovascular: normal rate, regular rhythm, no murmur, gallop, rub.   Abdomen: Soft, nontender, nondistended, normal bowel sounds, no hepatomegaly or splenomegaly  Neurologic: Unable to do neuro exam no focal deficit skin: No gross lesions, rashes, bruising or bleeding on exposed skin area  Extremities:  peripheral pulses palpable, no pedal edema or calf pain with palpation      Investigations:      Laboratory Testing:  Recent Results (from the past 24 hour(s))   CBC Auto Differential    Collection Time: 21  6:10 AM   Result Value Ref Range    WBC 5.8 3.5 - 11.0 k/uL    RBC 2.74 (L) 4.0 - 5.2 m/uL    Hemoglobin 8.6 (L) 12.0 - 16.0 g/dL    Hematocrit 26.3 (L) 36 - 46 %    MCV 95.8 80 - 100 fL    MCH 31.5 26 - 34 pg    MCHC 32.9 31 - 37 g/dL    RDW 14.1 11.5 - 14.9 %    Platelets 809 158 - 363 k/uL    MPV 7.0 6.0 - 12.0 fL    NRBC Automated NOT REPORTED per 100 WBC    Differential Type NOT REPORTED     Seg Neutrophils 42 36 - 66 %    Lymphocytes 40 24 - 44 %    Monocytes 10 (H) 1 - 7 %    Eosinophils % 7 (H) 0 - 4 %    Basophils 1 0 - 2 %    Immature Granulocytes NOT REPORTED 0 %    Segs Absolute 2.50 1.3 - 9.1 k/uL    Absolute Lymph # 2.30 1.0 - 4.8 k/uL    Absolute Mono # 0.60 0.1 - 1.3 k/uL    Absolute Eos # 0.40 0.0 - 0.4 k/uL    Basophils Absolute 0.00 0.0 - 0.2 k/uL    Absolute Immature Granulocyte NOT REPORTED 0.00 - 0.30 k/uL    WBC Morphology NOT REPORTED     RBC Morphology NOT REPORTED     Platelet Estimate NOT REPORTED            Consultations:   IP CONSULT TO INTERNAL MEDICINE  IP CONSULT TO INTERNAL MEDICINE  IP CONSULT TO DIETITIAN  IP CONSULT TO INTERNAL MEDICINE  IP CONSULT TO GI  IP CONSULT TO DIETITIAN  IP CONSULT TO DIETITIAN  Assessment :      Primary Problem  Acute psychosis ([facility] [address]    Active Hospital Problems    Diagnosis Date Noted    Celiac sprue [K90.0]     Disoriented [R41.0]     Severe malnutrition ([facility] [address] [E43] 06/18/2021    Acute psychosis ([facility] [address] [F23] 06/17/2021       Plan:     Patient is hard of hearing limited conversation reconsult for anemia hemoglobin 9 was 12 no active bleeding microcytic MCV 99  Will test for vitamin L62 folic acid celiac disease  June 30  Celiac disease positive GI consult for upper endoscopy and biopsy  Dietary consult for gluten-free diet  July 2  Strict gluten free diet plan for endoscopy by GI  We will sign off please reconsult if needed    Fatou Page MD  7/2/2021  4:31 PM    Copy sent to Dr. Ede Packer primary care provider on file. Please note that this chart was generated using voice recognition Dragon dictation software. Although every effort was made to ensure the accuracy of this automated transcription, some errors in transcription may have occurred.

## 2021-07-02 NOTE — BH NOTE
DISCHARGE UPDATE:  - Writer has sent client information to multiple ECF's and to date client has not been confirmed to be placed. - Writer informed client is unable to return to her home because per her daughter \"possibly getting condemned by Counts include 234 beds at the [facility] and it is filled with mice, feces, dirty food, bed bugs and it is not safe for her. \"  She states her father has not been staying there as well. - She states \"she can stay with me but we need some time to get her room together. \"  She states they are remodeling the house and it is going to take some time to get it ready but she has no problem having her come to live with her. She and her  need additional time.   Stevie Pi is at [phone]

## 2021-07-02 NOTE — GROUP NOTE
Group Therapy Note    Date: 7/2/2021    Group Start Time: 1100  Group End Time: 6809  Group Topic: Psychoeducation    [facility] D Romie Fleischer    Patient declined to attend social skills group at 1100 despite encouragement from staff. 1:1 talk time offered by staff as alternative to group session.       Signature:  Romie Fleischer

## 2021-07-02 NOTE — GROUP NOTE
Group Therapy Note    Date: 7/2/2021    Group Start Time: 1430  Group End Time: 1409  Group Topic: Psychoeducation    LUNA BONILLAI AKASH Boucher    Patient declined to attend social skills group at 1430 despite encouragement from staff. 1:1 talk time offered by staff as alternative to group session.         Signature:  Sarahy Boucher

## 2021-07-02 NOTE — PLAN OF CARE
Problem: Altered Mood, Deterioration in Function:  Goal: Able to verbalize reality based thinking  Description: Able to verbalize reality based thinking  7/1/2021 2145 by Ye Chaudhry LPN  Outcome: Ongoing     Problem: Altered Mood, Psychotic Behavior:  Goal: Able to verbalize decrease in frequency and intensity of hallucinations  Description: Able to verbalize decrease in frequency and intensity of hallucinations  7/1/2021 2145 by Ye Chaudhry LPN  Outcome: Ongoing     Problem: Falls - Risk of:  Goal: Will remain free from falls  Description: Will remain free from falls  7/1/2021 2145 by Ye Chaudhry LPN  Outcome: Ongoing     Problem: Nutrition  Goal: Optimal nutrition therapy  Outcome: Ongoing     Problem: Musculor/Skeletal Functional Status  Goal: Highest potential functional level  Outcome: Ongoing

## 2021-07-02 NOTE — CONSULTS
Comprehensive Nutrition Assessment    Type and Reason for Visit:  Consult, Patient Education, Reassess (Please provide educational materials related to Celiac Disease in \[facility]\"" as pt shared unable to read [place].)    Nutrition Recommendations/Plan: Continue current diet. Provide Ensure High Protein x 1 daily. Provide Glucerna x 2 daily. Offer additional Glucerna and other gluten-free snacks. Discontinue [employer] and [employer]. Suggest periodic glucose checks as deemed appropriate. Nutrition Assessment:  GI has recommended strict gluten-free diet. Staff report that pt is unable to read in \[facility]\"" or [place]. Staff have reviewed some information regarding diet with pt. This RD also explained basics of gluten free diet. At discharge, pt would benefit by provision of gluten-free diet at [facility] and/or family may also need diet education to assist pt with complaince. PO intake appears to be good with last recorded intake at % of meal. Nurse states pt likes Ensure supplements and asks for more; gluten free snacks are also needed for pt Nurse voiced concern about glucose levels (hx of DM noted); will change supplements to Glucerna x 2 with breakfast and lunch, Ensure High protein with dinner. Will offer gluten-free snacks which will include option for additional serving of Glucerna among other snacks. Nurse also will attempt to obtain wt measurement.     Malnutrition Assessment:  Malnutrition Status:  Severe malnutrition    Context:  Acute Illness     Findings of the 6 clinical characteristics of malnutrition:  Energy Intake:  7 - 50% or less of estimated energy requirements for 5 or more days (Improved)  Weight Loss:  7 - Greater than 5% over 1 month     Body Fat Loss:  7 - Moderate body fat loss Orbital, Triceps   Muscle Mass Loss:  7 - Moderate muscle mass loss Temples (temporalis), Clavicles (pectoralis & deltoids)  Fluid Accumulation:  No significant fluid accumulation     Strength:  Not Performed    Nutrition Related Findings:  No edema. Labs and meds reviewed. Wounds:  None       Current Nutrition Therapies:    Adult Oral Nutrition Supplement; Standard High Calorie/High Protein Oral Supplement  Adult Oral Nutrition Supplement; Clear Liquid Oral Supplement  Adult Oral Nutrition Supplement; Frozen Oral Supplement  ADULT DIET; Easy to Chew; Gluten Free    Anthropometric Measures:  · Height:  (unknown by patient)  · Current Body Weight:   Not available    Nutrition Diagnosis:   · Severe malnutrition related to inadequate protein-energy intake, psychological cause or life stress as evidenced by poor intake prior to admission, intake 0-25%, weight loss, moderate loss of subcutaneous fat, moderate muscle loss    Nutrition Interventions:   Food and/or Nutrient Delivery:  Continue Current Diet, Modify Oral Nutrition Supplement  Nutrition Education/Counseling:  Education completed (Caregiver will need diet education if pt is discharged to home. Note current plan for ECF.)   Coordination of Nutrition Care:  Continue to monitor while inpatient    Goals:  Meet greater than 75% of estimated nutrition needs       Nutrition Monitoring and Evaluation:   Behavioral-Environmental Outcomes:  Knowledge or Skill (Mental and communication issues)   Food/Nutrient Intake Outcomes:  Food and Nutrient Intake, Supplement Intake  Physical Signs/Symptoms Outcomes:  Biochemical Data, GI Status, Weight     Discharge Planning:    Continue current diet, Continue Oral Nutrition Supplement     Some areas of assessment may be incomplete due to standard COVID-19 Precautions. Yan Nunez R.D., L.D.   Phone: [phone]

## 2021-07-02 NOTE — PROGRESS NOTES
Behavioral Services                                              Medicare Re-Certification    I certify that the inpatient psychiatric hospital services furnished since the previous certification/re-certification were, and continue to be, medically necessary for;    [x] (1) Treatment which could reasonably be expected to improve the patient's condition,    [x] (2) Or for diagnostic study. Estimated length of stay/service 3-5 days    Plan for post-hospital care -outpatient care    This patient continues to need, on a daily basis, active treatment furnished directly by or requiring the supervision of inpatient psychiatric personnel.     Electronically signed by Carin Newman MD on 7/2/2021 at 5:59 PM

## 2021-07-02 NOTE — PLAN OF CARE
[facility] Interdisciplinary Treatment Plan Note     Review Date & Time: 7/2/2021 1308    Admission Type:   Admission Type: Involuntary    Reason for admission:  Reason for Admission: was found wandering and running in fields. Pt reports she was running away from her     Estimated Length of Stay Update:  Est 3-7 days, to be determined by physician  Estimated Discharge Date Update: to be determined by physician    PATIENT STRENGTHS:  Patient Strengths:Strengths: Positive Support  Patient Strengths and Limitations:Limitations: Perceives need for assistance with self-care, Multiple barriers to leisure interests  Addictive Behavior:Addictive Behavior  In the past 3 months, have you felt or has someone told you that you have a problem with:  : None  Do you have a history of Chemical Use?: No  Do you have a history of Alcohol Use?: No  Do you have a history of Street Drug Abuse?: No  Histroy of Prescripton Drug Abuse?: No  Medical Problems:   Past Medical History:   Diagnosis Date    Diabetes mellitus ([facility] [address]     Hypertension     Psychiatric problem        Risk:  Fall RiskTotal: 76  Hayden Scale Hayden Scale Score: 20  BVC Total: 1  Change in scores  NA .  Changes to plan of Care   NA     Status EXAM:   Status and Exam  Normal: No  Facial Expression: Worried  Affect: Appropriate  Level of Consciousness: Alert  Mood:Normal: No  Mood: Depressed, Anxious  Motor Activity:Normal: No  Motor Activity: Unusual Posture/Gait  Interview Behavior: Cooperative  Preception: [place] to Person, [place] to Place  Attention:Normal: No  Attention: Distractible  Thought Processes: Circumstantial  Thought Content:Normal: No  Thought Content: Preoccupations  Hallucinations: None  Delusions: No  Delusions:  (SAMIRA, pt sleeping all of shift)  Memory:Normal: No  Memory: Poor Recent, Poor Remote  Insight and Judgment: No  Insight and Judgment: Poor Judgment  Present Suicidal Ideation: No  Present Homicidal Ideation: No    Daily Assessment Last Entry:   Daily Sleep (WDL): Within Defined Limits         Patient Currently in Pain: Denies  Daily Nutrition (WDL): Within Defined Limits  Appetite Change: Normal for patient  Barriers to Nutrition: Elderly patient  Level of Assistance: Set up    Patient Monitoring:  Frequency of Checks: 4 times per hour, close    Psychiatric Symptoms:   Depression Symptoms  Depression Symptoms: Impaired concentration  Anxiety Symptoms  Anxiety Symptoms: Generalized  Judith Symptoms  Judith Symptoms: No problems reported or observed. Psychosis Symptoms  Delusion Type: No problems reported or observed.     Suicide Risk CSSR-S:  1) Within the past month, have you wished you were dead or wished you could go to sleep and not wake up? : No  2) Have you actually had any thoughts of killing yourself? : No  6) Have you ever done anything, started to do anything, or prepared to do anything to end your life?: No  Change in Result  NA  Change in Plan of care  NA     EDUCATION:   Learner Progress Toward Treatment Goals: Reviewed goals and plan of care    Method: Small group    Outcome: Needs reinforcement    PATIENT GOALS:  Patient was unable to attend     PLAN/TREATMENT RECOMMENDATIONS UPDATE:   COPING SKILLS CONTINUE WITH GROUP THERAPIES POSITIVE INTERACTIONS, GOAL SETTING    SHORT-TERM GOALS UPDATE:  Time frame for Short-Term Goals: 1-2 WEEKS     LONG-TERM GOALS UPDATE:  Time frame for Long-Term Goals: 6 MONTHS  Members Present in Team Meeting: See Signature Sheet    Cali Guerrero

## 2021-07-02 NOTE — PLAN OF CARE
Problem: Falls - Risk of:  Goal: Will remain free from falls  Description: Will remain free from falls  7/2/2021 1114 by Francis Wu LPN  Outcome: Ongoing   Patient remains free from falls     Problem: Falls - Risk of:  Goal: Absence of physical injury  Description: Absence of physical injury  Outcome: Ongoing   Patient remains free from injury

## 2021-07-02 NOTE — PROGRESS NOTES
Daily Progress Note  7/2/2021    Patient Name: Robert Nolasco    CHIEF COMPLAINT: Acute psychosis         SUBJECTIVE:  . Adi Mukherjee has no significant interval history change, she remains in behavioral control. Staff report that she did utilize hydroxyzine and trazodone related to anxiety and insomnia last evening at 2020 and patient reports with good effect. She remains visible on milieu and continues to enjoy watching television or looking out the window. She has been provided [facility] related to celiac disease and is reviewing independently. Patient continues to verbalize and understanding of her current hospitalization when provided in her left ear at very large volume. She denies suicidal ideation or side effects related to her current medications. She continues to require assistance with any ADL care. Appetite:  [x] Adequate/Unchanged  [] Increased  [] decreased    Sleep:       [x] Adequate/Unchanged  [] Fair  [] Poor    Group Attendance on Unit:   [] Yes  [] Selectively    [x] No-however she enjoys sitting on the milieu and engaging with others    Medication Side Effects: Denies         Mental Status Exam  Level of consciousness: Awake and alert  Appearance: Casually dressed with fair grooming and fair hygiene   behavior/Motor: Approachable, continues to require assistance with ADLs, [place] right hand tremor present, patient states able to control with concentration and focus on movement  Attitude toward examiner: Cooperative, mostly attentive, good eye contact  Speech: Loud, clear, well articulated  Mood: Anxious  Affect: Congruent, nervous   Thought processes: Perseverates on home environment, redirectable to linear responses  thought content:  Denies homicidal ideation  Suicidal Ideation: Denies suicidal ideations, contracts for safety on the unit.    Delusions: Denies  Perceptual Disturbance: does not appear to attend to internal stimuli  Cognition: Oriented to person, place only  memory: impaired   Insight: poor   Judgement: poor      Data   oral temperature is 98.2 °F (36.8 °C). Her blood pressure is 120/78 and her pulse is 133. Her respiration is 14 and oxygen saturation is 100%. Labs:   No results displayed because visit has over 200 results. Reviewed patient's current plan of care and vital signs with nursing staff. Labs reviewed: [x] Yes    Medications  Current Facility-Administered Medications: therapeutic multivitamin-minerals 1 tablet, 1 tablet, Oral, BID  OLANZapine (ZYPREXA) tablet 5 mg, 5 mg, Oral, Nightly  ciprofloxacin (CILOXAN) 0.3 % ophthalmic solution 2 drop, 2 drop, Right Eye, Q4H While awake  acetaminophen (TYLENOL) tablet 650 mg, 650 mg, Oral, Q4H PRN  ibuprofen (ADVIL;MOTRIN) tablet 400 mg, 400 mg, Oral, Q6H PRN  polyethylene glycol (GLYCOLAX) packet 17 g, 17 g, Oral, Daily PRN  aluminum & magnesium hydroxide-simethicone (MAALOX) 200-200-20 MG/5ML suspension 30 mL, 30 mL, Oral, Q6H PRN  hydrOXYzine (ATARAX) tablet 50 mg, 50 mg, Oral, TID PRN  traZODone (DESYREL) tablet 50 mg, 50 mg, Oral, Nightly PRN  allopurinol (ZYLOPRIM) tablet 100 mg, 100 mg, Oral, Daily  vitamin D3 (CHOLECALCIFEROL) tablet 5,000 Units, 1 tablet, Oral, Daily    ASSESSMENT  Acute psychosis ([facility] [address]         PLAN  Patient symptoms are: Stable  Continue current medication regimen  Monitor need and frequency of PRN medications. Internal medicine to continue following for medical management  Dietary following for malnutrition  Encourage participation in groups and milieu. Attempt to develop insight. Psycho-education conducted. Supportive Therapy conducted. Probable discharge is per attending physician, PASSR approved. ECF acceptance pending. Guardianship would be beneficial.  She is at a significant risk for decline if she returns home with her , she is high risk for neglect, lack of assess to basic needs (water and electricity) and overall safety.   Adult Protective Services will be notified by social work  Follow-up daily while inpatient. Patient continues to be monitored in the inpatient psychiatric facility at [facility] for safety and stabilization. Patient continues to need, on a daily basis, active treatment furnished directly by or requiring the supervision of inpatient psychiatric personnel. Electronically signed by PEREZ Hooker CNP on 7/2/2021 at 2:43 PM     I independently saw and evaluated the patient. I reviewed the midlevel provider's documentation above. Any additional comments or changes to the midlevel provider's documentation are stated below otherwise agree with assessment.      -The patient seems to have improved in her mood. She is able to engage a little better. The patient continues to have difficulty in communicating because of her hearing problems. I have been informed that the patient's house where she lives with her  has been condemned and she is not able to return there. We are waiting for approval for nursing home placement      PLAN  No changes to medications  Attempt to develop insight  Psycho-education conducted. Supportive Therapy conducted.   Probable discharge is 3 to 5 days  Follow-up daily while on inpatient unit    Electronically signed by Candi Zhong MD on 7/2/21 at 5:59 PM EDT

## 2021-07-03 LAB
ABSOLUTE EOS #: 0.4 K/UL (ref 0–0.4)
ABSOLUTE IMMATURE GRANULOCYTE: ABNORMAL K/UL (ref 0–0.3)
ABSOLUTE LYMPH #: 2.2 K/UL (ref 1–4.8)
ABSOLUTE MONO #: 0.5 K/UL (ref 0.1–1.3)
BASOPHILS # BLD: 1 % (ref 0–2)
BASOPHILS ABSOLUTE: 0.1 K/UL (ref 0–0.2)
DIFFERENTIAL TYPE: ABNORMAL
EOSINOPHILS RELATIVE PERCENT: 7 % (ref 0–4)
FERRITIN: 25 UG/L (ref 13–150)
HCT VFR BLD CALC: 27.3 % (ref 36–46)
HEMOGLOBIN: 9 G/DL (ref 12–16)
IMMATURE GRANULOCYTES: ABNORMAL %
IRON SATURATION: 14 % (ref 20–55)
IRON: 54 UG/DL (ref 37–145)
LYMPHOCYTES # BLD: 41 % (ref 24–44)
MCH RBC QN AUTO: 31.9 PG (ref 26–34)
MCHC RBC AUTO-ENTMCNC: 33.1 G/DL (ref 31–37)
MCV RBC AUTO: 96.5 FL (ref 80–100)
MONOCYTES # BLD: 8 % (ref 1–7)
NRBC AUTOMATED: ABNORMAL PER 100 WBC
PDW BLD-RTO: 13.8 % (ref 11.5–14.9)
PLATELET # BLD: 243 K/UL (ref 150–450)
PLATELET ESTIMATE: ABNORMAL
PMV BLD AUTO: 7.4 FL (ref 6–12)
RBC # BLD: 2.83 M/UL (ref 4–5.2)
RBC # BLD: ABNORMAL 10*6/UL
SEG NEUTROPHILS: 43 % (ref 36–66)
SEGMENTED NEUTROPHILS ABSOLUTE COUNT: 2.4 K/UL (ref 1.3–9.1)
TOTAL IRON BINDING CAPACITY: 383 UG/DL (ref 250–450)
UNSATURATED IRON BINDING CAPACITY: 329 UG/DL (ref 112–347)
WBC # BLD: 5.5 K/UL (ref 3.5–11)
WBC # BLD: ABNORMAL 10*3/UL

## 2021-07-03 PROCEDURE — 6370000000 HC RX 637 (ALT 250 FOR IP): Performed by: NURSE PRACTITIONER

## 2021-07-03 PROCEDURE — 36415 COLL VENOUS BLD VENIPUNCTURE: CPT

## 2021-07-03 PROCEDURE — 83540 ASSAY OF IRON: CPT

## 2021-07-03 PROCEDURE — 82728 ASSAY OF FERRITIN: CPT

## 2021-07-03 PROCEDURE — APPSS30 APP SPLIT SHARED TIME 16-30 MINUTES: Performed by: NURSE PRACTITIONER

## 2021-07-03 PROCEDURE — 6370000000 HC RX 637 (ALT 250 FOR IP): Performed by: PSYCHIATRY & NEUROLOGY

## 2021-07-03 PROCEDURE — 6370000000 HC RX 637 (ALT 250 FOR IP)

## 2021-07-03 PROCEDURE — 83550 IRON BINDING TEST: CPT

## 2021-07-03 PROCEDURE — 85025 COMPLETE CBC W/AUTO DIFF WBC: CPT

## 2021-07-03 PROCEDURE — 1240000000 HC EMOTIONAL WELLNESS R&B

## 2021-07-03 PROCEDURE — 99231 SBSQ HOSP IP/OBS SF/LOW 25: CPT | Performed by: PSYCHIATRY & NEUROLOGY

## 2021-07-03 PROCEDURE — 6370000000 HC RX 637 (ALT 250 FOR IP): Performed by: INTERNAL MEDICINE

## 2021-07-03 PROCEDURE — 99232 SBSQ HOSP IP/OBS MODERATE 35: CPT | Performed by: INTERNAL MEDICINE

## 2021-07-03 RX ADMIN — CIPROFLOXACIN 2 DROP: 3 SOLUTION OPHTHALMIC at 14:45

## 2021-07-03 RX ADMIN — Medication 5000 UNITS: at 09:25

## 2021-07-03 RX ADMIN — MULTIPLE VITAMINS W/ MINERALS TAB 1 TABLET: TAB at 21:11

## 2021-07-03 RX ADMIN — CIPROFLOXACIN 2 DROP: 3 SOLUTION OPHTHALMIC at 09:25

## 2021-07-03 RX ADMIN — TRAZODONE HYDROCHLORIDE 50 MG: 50 TABLET ORAL at 21:11

## 2021-07-03 RX ADMIN — CIPROFLOXACIN 2 DROP: 3 SOLUTION OPHTHALMIC at 22:00

## 2021-07-03 RX ADMIN — CIPROFLOXACIN 2 DROP: 3 SOLUTION OPHTHALMIC at 18:09

## 2021-07-03 RX ADMIN — IBUPROFEN 400 MG: 400 TABLET, FILM COATED ORAL at 21:11

## 2021-07-03 RX ADMIN — MULTIPLE VITAMINS W/ MINERALS TAB 1 TABLET: TAB at 09:25

## 2021-07-03 RX ADMIN — ALLOPURINOL 100 MG: 100 TABLET ORAL at 09:25

## 2021-07-03 RX ADMIN — HYDROXYZINE HYDROCHLORIDE 50 MG: 50 TABLET, FILM COATED ORAL at 21:11

## 2021-07-03 RX ADMIN — OLANZAPINE 5 MG: 5 TABLET, FILM COATED ORAL at 21:11

## 2021-07-03 ASSESSMENT — PAIN SCALES - GENERAL
PAINLEVEL_OUTOF10: 5
PAINLEVEL_OUTOF10: 0

## 2021-07-03 NOTE — PROGRESS NOTES
Daily Progress Note  7/3/2021    Patient Name: Alba George    CHIEF COMPLAINT: Acute psychosis         SUBJECTIVE:  . Quita Heath has no significant interval history change, she remains in behavioral control. Nursing staff report that she did utilize hydroxyzine and trazodone related to anxiety and insomnia with good effect. Patient remains visible on unit and engages when spoken to and loud voice close to her ear. She has some complaints today related to her dietary restriction and is requested a hamburger and pancakes. Reviewed the importance of compliance with gluten-free diet related to her recent celiac disease diagnosis and she verbalizes some understanding. Nursing team will attempt to obtain gluten-free items for her food tray. She denies suicidal ideation or side effects related to her current medications. She continues to require assistance with any ADL care. Appetite:  [x] Adequate/Unchanged  [] Increased  [] decreased    Sleep:       [x] Adequate/Unchanged  [] Fair  [] Poor    Group Attendance on Unit:   [] Yes  [] Selectively    [x] No-however she enjoys sitting on the milieu and engaging with others    Medication Side Effects: Denies         Mental Status Exam  Level of consciousness: Awake and alert  Appearance: Casually dressed with fair grooming and fair hygiene   behavior/Motor: Approachable, continues to require assistance with ADLs, [place] right hand tremor present, patient states able to control with concentration and focus on movement  Attitude toward examiner: Cooperative, mostly attentive, good eye contact  Speech: Loud, clear, well articulated  Mood: Anxious  Affect: Congruent, nervous   Thought processes: Perseverates on home environment, redirectable to linear responses  thought content:  Denies homicidal ideation  Suicidal Ideation: Denies suicidal ideations, contracts for safety on the unit.    Delusions: Denies  Perceptual Disturbance: does not appear to attend to internal stimuli  Cognition: Oriented to person, place only  memory: impaired   Insight: poor   Judgement: poor      Data   oral temperature is 98.2 °F (36.8 °C). Her blood pressure is 119/56 (abnormal) and her pulse is 73. Her respiration is 14 and oxygen saturation is 100%. Labs:   No results displayed because visit has over 200 results. Reviewed patient's current plan of care and vital signs with nursing staff. Labs reviewed: [x] Yes    Medications  Current Facility-Administered Medications: therapeutic multivitamin-minerals 1 tablet, 1 tablet, Oral, BID  OLANZapine (ZYPREXA) tablet 5 mg, 5 mg, Oral, Nightly  ciprofloxacin (CILOXAN) 0.3 % ophthalmic solution 2 drop, 2 drop, Right Eye, Q4H While awake  acetaminophen (TYLENOL) tablet 650 mg, 650 mg, Oral, Q4H PRN  ibuprofen (ADVIL;MOTRIN) tablet 400 mg, 400 mg, Oral, Q6H PRN  polyethylene glycol (GLYCOLAX) packet 17 g, 17 g, Oral, Daily PRN  aluminum & magnesium hydroxide-simethicone (MAALOX) 200-200-20 MG/5ML suspension 30 mL, 30 mL, Oral, Q6H PRN  hydrOXYzine (ATARAX) tablet 50 mg, 50 mg, Oral, TID PRN  traZODone (DESYREL) tablet 50 mg, 50 mg, Oral, Nightly PRN  allopurinol (ZYLOPRIM) tablet 100 mg, 100 mg, Oral, Daily  vitamin D3 (CHOLECALCIFEROL) tablet 5,000 Units, 1 tablet, Oral, Daily    ASSESSMENT  Acute psychosis ([facility] [address]         PLAN  Patient symptoms are: Stable  Continue current medication regimen  Monitor need and frequency of PRN medications. Internal medicine to continue following for medical management  Dietary following for malnutrition  Encourage participation in groups and milieu. Attempt to develop insight. Psycho-education conducted. Supportive Therapy conducted. Probable discharge is per attending physician, PASSR approved. ECF acceptance pending.   Guardianship would be beneficial.  She is at a significant risk for decline if she returns home with her , she is high risk for neglect, lack of assess to basic needs (water and electricity) and overall safety as team has been updated at the home has been condemned. Adult Protective Services will be notified by social work  Follow-up daily while inpatient. Patient continues to be monitored in the inpatient psychiatric facility at [facility] for safety and stabilization. Patient continues to need, on a daily basis, active treatment furnished directly by or requiring the supervision of inpatient psychiatric personnel.     Electronically signed by PEREZ Ordonez CNP on 7/3/2021 at 4:42 PM     T

## 2021-07-03 NOTE — PLAN OF CARE
Problem: Falls - Risk of:  Goal: Will remain free from falls  Description: Will remain free from falls  Outcome: Ongoing  Note: Patient remains free from falls and injury. Medication compliant and behavior controlled. Sitting in the dayroom much of shift. Needs help getting to the bathroom.

## 2021-07-03 NOTE — PROGRESS NOTES
[facility]    Gastroenterology Daily Progress Note      Patient:   Higinio Perez   :    1945   Facility:   [facility]  Date:     7/3/2021  Consultant:   PEREZ Delacruz CNP, CNP      SUBJECTIVE  77 y.o. female admitted 2021 with Acute psychosis ([facility] [address] [F23] and seen for celiac disease. The pt was seen and examined. She is alert, oriented, no c/o abdominal pain or nausea. Tolerating a regular diet per RN. Per the RN the pt still has intermittent periods of confusion.          OBJECTIVE  Scheduled Meds:   therapeutic multivitamin-minerals  1 tablet Oral BID    OLANZapine  5 mg Oral Nightly    ciprofloxacin  2 drop Right Eye Q4H While awake    allopurinol  100 mg Oral Daily    vitamin D3  1 tablet Oral Daily       Vital Signs:  BP (!) 119/56   Pulse 73   Temp 98.2 °F (36.8 °C) (Oral)   Resp 14   SpO2 100%      Physical Exam:     General Appearance: alert and oriented to person, place and time, well-developed and well-nourished, in no acute distress  Skin: warm and dry, no rash or erythema  Head: normocephalic and atraumatic  Eyes: pupils equal, round, and reactive to light, extraocular eye movements intact, conjunctivae normal  ENT: hearing grossly normal bilaterally  Neck: neck supple and non tender without mass, no thyromegaly or thyroid nodules, no cervical lymphadenopathy   Pulmonary/Chest: clear to auscultation bilaterally- no wheezes, rales or rhonchi, normal air movement, no respiratory distress  Cardiovascular: normal rate, regular rhythm, normal S1 and S2, no murmurs, rubs, clicks or gallops, distal pulses intact, no carotid bruits  Abdomen: soft, non-tender, non-distended, normal bowel sounds, no masses or organomegaly  Extremities: no cyanosis, clubbing or edema  Musculoskeletal: normal range of motion, no joint swelling, deformity or tenderness  Neurologic: no cranial nerve deficit and muscle strength normal    Lab and Imaging Review     CBC  Recent Labs 06/30/21  0804 07/01/21  0608 07/02/21  0610   WBC 6.4 6.5 5.8   HGB 9.1* 8.2* 8.6*   HCT 28.1* 25.1* 26.3*   MCV 96.8 96.3 95.8    256 275     ASSESSMENT/plan  1. Positive celiac serology  -gluten free diet  -eventually will need egd with small bowel bx inpatient vs outpt, will need to find out who is POA since pt has intermittent confusion  -will d/w md    2.anemia; no overt bleeding  -fobt ordered  -will order iron studies    This plan was formulated in collaboration with Dr. Nancy Mckenzie    Electronically signed by: PEREZ James CNP on 7/3/2021 at 7:14 AM     Attending Physician Statement  I have discussed the care of [facility] and   I have examined the patient myselft independently, and taken ros and hpi , including pertinent history and exam findings,  with the author of this note . I have reviewed the key elements of all parts of the encounter with the nurse practitioner/resident.     I agree with the assessment, plan and orders as documented by the above health care provider       We will check the iron studies and see if she needs IV iron   She will need an EGD colonoscopy EGD to take biopsy to rule out celiac and colonoscopy because of the anemia  Dose can be done outpatient if needed    electronically signed by Erasmo Bartholomew MD

## 2021-07-03 NOTE — GROUP NOTE
Group Therapy Note    Date: 7/3/2021    Group Start Time: 1000  Group End Time: 4327  Group Topic: Psychoeducation    LUNA Kuhn        Group Therapy Note       Patient refused to attend psychotherapy group after encouragement from staff. 1:1 talk time offered but refused. Signature:   Julio Kuhn

## 2021-07-04 LAB
ABSOLUTE EOS #: 0.3 K/UL (ref 0–0.4)
ABSOLUTE IMMATURE GRANULOCYTE: ABNORMAL K/UL (ref 0–0.3)
ABSOLUTE LYMPH #: 2.1 K/UL (ref 1–4.8)
ABSOLUTE MONO #: 0.5 K/UL (ref 0.1–1.3)
BASOPHILS # BLD: 1 % (ref 0–2)
BASOPHILS ABSOLUTE: 0.1 K/UL (ref 0–0.2)
DIFFERENTIAL TYPE: ABNORMAL
EOSINOPHILS RELATIVE PERCENT: 6 % (ref 0–4)
HCT VFR BLD CALC: 28.7 % (ref 36–46)
HEMOGLOBIN: 9.3 G/DL (ref 12–16)
IMMATURE GRANULOCYTES: ABNORMAL %
LYMPHOCYTES # BLD: 37 % (ref 24–44)
MCH RBC QN AUTO: 31 PG (ref 26–34)
MCHC RBC AUTO-ENTMCNC: 32.5 G/DL (ref 31–37)
MCV RBC AUTO: 95.6 FL (ref 80–100)
MONOCYTES # BLD: 8 % (ref 1–7)
NRBC AUTOMATED: ABNORMAL PER 100 WBC
PDW BLD-RTO: 14.1 % (ref 11.5–14.9)
PLATELET # BLD: 306 K/UL (ref 150–450)
PLATELET ESTIMATE: ABNORMAL
PMV BLD AUTO: 7.2 FL (ref 6–12)
RBC # BLD: 3.01 M/UL (ref 4–5.2)
RBC # BLD: ABNORMAL 10*6/UL
SEG NEUTROPHILS: 48 % (ref 36–66)
SEGMENTED NEUTROPHILS ABSOLUTE COUNT: 2.8 K/UL (ref 1.3–9.1)
WBC # BLD: 5.8 K/UL (ref 3.5–11)
WBC # BLD: ABNORMAL 10*3/UL

## 2021-07-04 PROCEDURE — 6370000000 HC RX 637 (ALT 250 FOR IP): Performed by: INTERNAL MEDICINE

## 2021-07-04 PROCEDURE — 99231 SBSQ HOSP IP/OBS SF/LOW 25: CPT | Performed by: INTERNAL MEDICINE

## 2021-07-04 PROCEDURE — 99231 SBSQ HOSP IP/OBS SF/LOW 25: CPT | Performed by: PSYCHIATRY & NEUROLOGY

## 2021-07-04 PROCEDURE — 6370000000 HC RX 637 (ALT 250 FOR IP)

## 2021-07-04 PROCEDURE — 97110 THERAPEUTIC EXERCISES: CPT

## 2021-07-04 PROCEDURE — 1240000000 HC EMOTIONAL WELLNESS R&B

## 2021-07-04 PROCEDURE — 97116 GAIT TRAINING THERAPY: CPT

## 2021-07-04 PROCEDURE — 6370000000 HC RX 637 (ALT 250 FOR IP): Performed by: PSYCHIATRY & NEUROLOGY

## 2021-07-04 PROCEDURE — APPSS30 APP SPLIT SHARED TIME 16-30 MINUTES: Performed by: NURSE PRACTITIONER

## 2021-07-04 PROCEDURE — 85025 COMPLETE CBC W/AUTO DIFF WBC: CPT

## 2021-07-04 PROCEDURE — 6370000000 HC RX 637 (ALT 250 FOR IP): Performed by: NURSE PRACTITIONER

## 2021-07-04 PROCEDURE — 36415 COLL VENOUS BLD VENIPUNCTURE: CPT

## 2021-07-04 RX ADMIN — CIPROFLOXACIN 2 DROP: 3 SOLUTION OPHTHALMIC at 18:20

## 2021-07-04 RX ADMIN — OLANZAPINE 5 MG: 5 TABLET, FILM COATED ORAL at 21:36

## 2021-07-04 RX ADMIN — CIPROFLOXACIN 2 DROP: 3 SOLUTION OPHTHALMIC at 10:05

## 2021-07-04 RX ADMIN — ACETAMINOPHEN 650 MG: 325 TABLET ORAL at 21:36

## 2021-07-04 RX ADMIN — ALLOPURINOL 100 MG: 100 TABLET ORAL at 10:05

## 2021-07-04 RX ADMIN — Medication 5000 UNITS: at 10:05

## 2021-07-04 RX ADMIN — MULTIPLE VITAMINS W/ MINERALS TAB 1 TABLET: TAB at 21:36

## 2021-07-04 RX ADMIN — TRAZODONE HYDROCHLORIDE 50 MG: 50 TABLET ORAL at 21:36

## 2021-07-04 RX ADMIN — CIPROFLOXACIN 2 DROP: 3 SOLUTION OPHTHALMIC at 14:54

## 2021-07-04 RX ADMIN — CIPROFLOXACIN 2 DROP: 3 SOLUTION OPHTHALMIC at 21:36

## 2021-07-04 RX ADMIN — MULTIPLE VITAMINS W/ MINERALS TAB 1 TABLET: TAB at 10:05

## 2021-07-04 ASSESSMENT — PAIN SCALES - GENERAL
PAINLEVEL_OUTOF10: 3
PAINLEVEL_OUTOF10: 0

## 2021-07-04 NOTE — BH NOTE
patient refused to attend goals group at 0900 after encouragement from staff.   1:1 talk time provided as alternative to group session

## 2021-07-04 NOTE — PLAN OF CARE
Problem: Falls - Risk of:  Goal: Will remain free from falls  Description: Will remain free from falls  Outcome: Ongoing   Patient remains free of falls at this time. She is able to communicate with staff on her needs such as restroom, and when she would like to be transported from bed to wheelchair. She is a one person assist.  Problem: Altered Mood, Deterioration in Function:  Goal: Able to verbalize reality based thinking  Description: Able to verbalize reality based thinking  Outcome: Ongoing   Patient is oriented to her surroundings, she is hard of hearing so it makes her seem confused, but she is aware of where she is and her situation. Problem: Altered Mood, Psychotic Behavior:  Goal: Able to verbalize decrease in frequency and intensity of hallucinations  Description: Able to verbalize decrease in frequency and intensity of hallucinations  Outcome: Ongoing   Patient denies hallucinations. Patient is fifteen minute safety check.

## 2021-07-04 NOTE — PLAN OF CARE
Problem: Altered Mood, Deterioration in Function:  Goal: Able to verbalize reality based thinking  Description: Able to verbalize reality based thinking  Outcome: Ongoing     Problem: Nutrition  Goal: Optimal nutrition therapy  Outcome: Ongoing     Problem: Musculor/Skeletal Functional Status  Goal: Highest potential functional level  Outcome: Ongoing   Patient is out in day area, watching television but aloof of peers. Patient denies suicidal homicidal ideation and hallucinations, and is medication complaint. Patient displays increased appetite and is able to express needs appropriately. Patient does not seem to  on social que, tends to require assistance when there is an increase in stimulation to the unit.

## 2021-07-04 NOTE — PROGRESS NOTES
WBC 5.8 5.5   HGB 8.6* 9.0*   HCT 26.3* 27.3*   MCV 95.8 96.5    243         ANEMIA STUDIES  Recent Labs     07/03/21  1350   LABIRON 14*   TIBC 383   FERRITIN 25         ASSESSMENT/plan  1. Positive celiac serology  -gluten free diet  -likely outpt egd and colonoscopy with bx      2.anemia no overt bleeding low iron saturation  -will need outpt monitoring of hgb, endoscopy    D/w md gi signing off    This plan was formulated in collaboration with Dr. Power Martinez . Electronically signed by: PEREZ Delacruz CNP on 7/4/2021 at 7:17 AM       Attending Physician Statement  I have discussed the care of [facility] and   I have examined the patient myselft independently, and taken ros and hpi , including pertinent history and exam findings,  with the author of this note . I have reviewed the key elements of all parts of the encounter with the nurse practitioner/resident.     I agree with the assessment, plan and orders as documented by the above health care provider       Outpatient EGD with biopsy to rule out celiac outpatient colonoscopy because of the anemia  We will sign off please call if needed   Electronically signed by Laura Banuelos MD

## 2021-07-04 NOTE — PROGRESS NOTES
Physical Therapy  Facility/Department: [facility]  Daily Treatment Note  NAME: Mata Slaughter  : 1945  MRN: [id number]    Date of Service: 2021    Discharge Recommendations:  Patient would benefit from continued therapy after discharge        Assessment   Body structures, Functions, Activity limitations: Decreased functional mobility ; Decreased strength;Decreased safe awareness;Decreased endurance;Decreased cognition;Decreased balance;Decreased posture;Decreased coordination  Assessment: Pt presents with weakness, decreased coordination and balance affecting her mobility, Pt fatiques easily, but tolerates fairely well with rest breaks in between. Pt very cooperative throug out the session. Pt is one person assist for transfers and ambulation, pt high fall risk without assist for mobility  Treatment Diagnosis: impaired mobility  Specific instructions for Next Treatment: Strengthening/balance ex's  Prognosis: Fair  Decision Making: Medium Complexity  Barriers to Learning: Cognitive  REQUIRES PT FOLLOW UP: Yes  Activity Tolerance  Activity Tolerance: Patient limited by endurance; Patient limited by fatigue;Patient limited by cognitive status     Patient Diagnosis(es): There were no encounter diagnoses. has a past medical history of Diabetes mellitus ([facility] [address] Hypertension, and Psychiatric problem. has no past surgical history on file. Restrictions  Restrictions/Precautions  Restrictions/Precautions: Fall Risk  Subjective   General  Additional Pertinent Hx: Mata Slaughter is a 77 y.o. female with significant past medical history of diabeties who presented to the ED via [address] [place] [address] for wondering behavior, knocking on doors and disorganized bizarre behavior. Per ED physician at [facility], patient was found wandering the neighborhood, accusing her  of hurting her, disorganized thought process and bizarre behavior.     Subjective  Subjective: Pt in day area in a w/c with her legs elevated on a chair in front of her. Pt [place], heard better from R ear. General Comment  Comments: Pt is coopertive, required more time to perform/understand command. (Not sure how much pt understand.)  Pain Screening  Patient Currently in Pain: Denies  Vital Signs  BP Location: Right upper arm  Level of Consciousness: Alert (0)  Patient Currently in Pain: Denies  Oxygen Therapy  O2 Device: None (Room air)  Patient Observation  Observations: Tremors noted R UE       Orientation  Orientation  Overall Orientation Status: Impaired  Orientation Level: Oriented to person;Disoriented to place; Disoriented to time;Disoriented to situation  Cognition      Objective      Transfers  Sit to Stand: Minimal Assistance (min to mod difficulty following cues)  Stand to sit: Minimal Assistance  Bed to Chair: Minimal assistance  Comment: Standing with RW  Ambulation  Ambulation?: Yes  Ambulation 1  Surface: level tile  Device: Rolling Walker  Other Apparatus: Wheelchair follow (Therapist pulling w/c behind as pt walks.)  Assistance: Minimal assistance  Quality of Gait: Decreased step length B LE, L > R  Gait Deviations: Decreased step length;Decreased step height;Slow Adriana (NBOS)  Distance: 45' x 2; with 1 seated rest break in w/c. VGBio Balance  Posture: Fair  Sitting - Static: Good;-  Sitting - Dynamic: Fair  Standing - Static: Fair;+ (RW)  Standing - Dynamic: Fair;- (RW)  Other exercises  Other exercises?: Yes  Other exercises 1: Ther ex both L/E seated x 10  Other exercises 2: Ther ex both L/E standing x 10, pt supporting B UE in a table in the day area.   (Pt easily fatigued)         Other Activities: Other (see comment) (Sit<>Stand x 4, strong cues for hand placement)              G-Code     OutComes Score                                                     AM-PAC Score             Goals  Short term goals  Time Frame for Short term goals: 8 visits  Short term goal 1: Pt able to perform supine<.sit CGA  Short term goal 2:  pt able to transfer at [facility] 346 term goal 3: Pt able to [facility] with rolling walker distance of 100 ft x 2, CGA, with mod cues topick up legs and widen JOSUE. Short term goal 4: Pt to participate in balance/strengthening ex's to improve fucntion. Patient Goals   Patient goals : Can I go home? .    Plan    Plan  Times per week: 3 x/week  Specific instructions for Next Treatment: Strengthening/balance ex's  Safety Devices  Type of devices: Call light within reach, Left in bed, All fall risk precautions in place     Therapy Time   Individual Concurrent Group Co-treatment   Time In 1330         Time Out 1353         Minutes [address] [facility], PT

## 2021-07-04 NOTE — PROGRESS NOTES
Daily Progress Note  7/4/2021    Patient Name: Linda Carreno    CHIEF COMPLAINT: Acute psychosis         SUBJECTIVE:  .    Mrs. Vika Garcia has no significant interval history change, she remains in behavioral control. Nursing staff report she has maintained medication adherence and continues to utilize hydroxyzine and trazodone related to anxiety and insomnia with good effect. Patient remains visible on unit and engages when spoken to and loud voice close to her ear. She denies discomfort or complaints today and is grateful for assistance in her toileting and bathing needs. She has been provided gluten-free breads and is grateful with her lunch meal She denies suicidal ideation or side effects related to her current medications. She continues to require assistance with any ADL care. Appetite:  [x] Adequate/Unchanged  [] Increased  [] decreased    Sleep:       [x] Adequate/Unchanged  [] Fair  [] Poor    Group Attendance on Unit:   [] Yes  [] Selectively    [x] No-however she enjoys sitting on the milieu and engaging with others    Medication Side Effects: Denies         Mental Status Exam  Level of consciousness: Awake and alert  Appearance: Casually dressed with good grooming and good hygiene   behavior/Motor: Approachable, continues to require assistance with ADLs, [place]- right hand tremor present, patient states able to control with concentration and focus on movement  Attitude toward examiner: Cooperative, attentive, good eye contact  Speech: Loud, clear, well articulated  Mood: Less anxious today  Affect: Congruent, nervous   Thought processes: Perseverates on home environment, redirectable to linear responses  thought content:  Denies homicidal ideation  Suicidal Ideation: Denies suicidal ideations, contracts for safety on the unit.    Delusions: Denies  Perceptual Disturbance: does not appear to attend to internal stimuli  Cognition: Oriented to person, place only  memory: impaired   Insight: poor Judgement: poor      Data   oral temperature is 98.2 °F (36.8 °C). Her blood pressure is 105/54 (abnormal) and her pulse is 66. Her respiration is 14 and oxygen saturation is 100%. Labs:   No results displayed because visit has over 200 results. Reviewed patient's current plan of care and vital signs with nursing staff. Labs reviewed: [x] Yes    Medications  Current Facility-Administered Medications: therapeutic multivitamin-minerals 1 tablet, 1 tablet, Oral, BID  OLANZapine (ZYPREXA) tablet 5 mg, 5 mg, Oral, Nightly  ciprofloxacin (CILOXAN) 0.3 % ophthalmic solution 2 drop, 2 drop, Right Eye, Q4H While awake  acetaminophen (TYLENOL) tablet 650 mg, 650 mg, Oral, Q4H PRN  ibuprofen (ADVIL;MOTRIN) tablet 400 mg, 400 mg, Oral, Q6H PRN  polyethylene glycol (GLYCOLAX) packet 17 g, 17 g, Oral, Daily PRN  aluminum & magnesium hydroxide-simethicone (MAALOX) 200-200-20 MG/5ML suspension 30 mL, 30 mL, Oral, Q6H PRN  hydrOXYzine (ATARAX) tablet 50 mg, 50 mg, Oral, TID PRN  traZODone (DESYREL) tablet 50 mg, 50 mg, Oral, Nightly PRN  allopurinol (ZYLOPRIM) tablet 100 mg, 100 mg, Oral, Daily  vitamin D3 (CHOLECALCIFEROL) tablet 5,000 Units, 1 tablet, Oral, Daily    ASSESSMENT  Acute psychosis ([facility] [address]         PLAN  Patient symptoms are: Stable  Continue current medication regimen  Monitor need and frequency of PRN medications. Internal medicine to continue following for medical management  Dietary following for malnutrition  Encourage participation in groups and milieu. Attempt to develop insight. Psycho-education conducted. Supportive Therapy conducted. Probable discharge is per attending physician, PASSR approved. ECF acceptance pending.   Guardianship would be beneficial.  She is at a significant risk for decline if she returns home with her , she is high risk for neglect, lack of assess to basic needs (water and electricity) and overall safety as team has been updated at the home has been condemned. Adult Protective Services will be notified by social work  Follow-up daily while inpatient. Patient continues to be monitored in the inpatient psychiatric facility at [facility] for safety and stabilization. Patient continues to need, on a daily basis, active treatment furnished directly by or requiring the supervision of inpatient psychiatric personnel.     Electronically signed by Jeraldine Closs, APRN - CNP on 7/4/2021 at 12:34 PM     T

## 2021-07-04 NOTE — BH NOTE
FAMILY MEETING AND DISCHARGE PLANNING:  - Writer meets with client's daughter, Sukhi Valentino regarding client coming to live with her, her  and her children. She states \"I can take care of my mother and I discussed this with my family and we want her home. \"  - She states they are cleaning out one of their bedrooms and going to purchase a bed and other furniture for her. She states they just moved into a 5-bedroom home and they are in the process of remodeling it. - She would like writer to set-up home health, bed and other things for her to have when she arrives. - Writer is told Ms. Stiles's brother is client POA and client's  is still alive and no guardianship in place. Writer to [employer] risk management to make sure there is no problem discharging client to her daughter's home and no issues with her son being POA.

## 2021-07-05 VITALS
OXYGEN SATURATION: 100 % | SYSTOLIC BLOOD PRESSURE: 162 MMHG | DIASTOLIC BLOOD PRESSURE: 139 MMHG | HEART RATE: 93 BPM | RESPIRATION RATE: 14 BRPM | TEMPERATURE: 98.3 F

## 2021-07-05 LAB
ABSOLUTE EOS #: 0.4 K/UL (ref 0–0.4)
ABSOLUTE IMMATURE GRANULOCYTE: ABNORMAL K/UL (ref 0–0.3)
ABSOLUTE LYMPH #: 1.8 K/UL (ref 1–4.8)
ABSOLUTE MONO #: 0.5 K/UL (ref 0.1–1.3)
BASOPHILS # BLD: 1 % (ref 0–2)
BASOPHILS ABSOLUTE: 0 K/UL (ref 0–0.2)
BILIRUBIN URINE: NEGATIVE
COLOR: YELLOW
COMMENT UA: NORMAL
DIFFERENTIAL TYPE: ABNORMAL
EOSINOPHILS RELATIVE PERCENT: 7 % (ref 0–4)
GLUCOSE URINE: NEGATIVE
HCT VFR BLD CALC: 26.2 % (ref 36–46)
HEMOGLOBIN: 8.4 G/DL (ref 12–16)
IMMATURE GRANULOCYTES: ABNORMAL %
KETONES, URINE: NEGATIVE
LEUKOCYTE ESTERASE, URINE: NEGATIVE
LYMPHOCYTES # BLD: 33 % (ref 24–44)
MCH RBC QN AUTO: 30.8 PG (ref 26–34)
MCHC RBC AUTO-ENTMCNC: 32.2 G/DL (ref 31–37)
MCV RBC AUTO: 95.8 FL (ref 80–100)
MONOCYTES # BLD: 10 % (ref 1–7)
NITRITE, URINE: NEGATIVE
NRBC AUTOMATED: ABNORMAL PER 100 WBC
PDW BLD-RTO: 13.9 % (ref 11.5–14.9)
PH UA: 7 (ref 5–8)
PLATELET # BLD: 326 K/UL (ref 150–450)
PLATELET ESTIMATE: ABNORMAL
PMV BLD AUTO: 7 FL (ref 6–12)
PROTEIN UA: NEGATIVE
RBC # BLD: 2.73 M/UL (ref 4–5.2)
RBC # BLD: ABNORMAL 10*6/UL
SEG NEUTROPHILS: 49 % (ref 36–66)
SEGMENTED NEUTROPHILS ABSOLUTE COUNT: 2.7 K/UL (ref 1.3–9.1)
SPECIFIC GRAVITY UA: 1.02 (ref 1–1.03)
TURBIDITY: CLEAR
URINE HGB: NEGATIVE
UROBILINOGEN, URINE: NORMAL
WBC # BLD: 5.5 K/UL (ref 3.5–11)
WBC # BLD: ABNORMAL 10*3/UL

## 2021-07-05 PROCEDURE — 6370000000 HC RX 637 (ALT 250 FOR IP): Performed by: PSYCHIATRY & NEUROLOGY

## 2021-07-05 PROCEDURE — 85025 COMPLETE CBC W/AUTO DIFF WBC: CPT

## 2021-07-05 PROCEDURE — 6370000000 HC RX 637 (ALT 250 FOR IP)

## 2021-07-05 PROCEDURE — 97165 OT EVAL LOW COMPLEX 30 MIN: CPT

## 2021-07-05 PROCEDURE — 97116 GAIT TRAINING THERAPY: CPT

## 2021-07-05 PROCEDURE — APPSS30 APP SPLIT SHARED TIME 16-30 MINUTES: Performed by: PSYCHIATRY & NEUROLOGY

## 2021-07-05 PROCEDURE — 36415 COLL VENOUS BLD VENIPUNCTURE: CPT

## 2021-07-05 PROCEDURE — 1240000000 HC EMOTIONAL WELLNESS R&B

## 2021-07-05 PROCEDURE — 99232 SBSQ HOSP IP/OBS MODERATE 35: CPT | Performed by: PSYCHIATRY & NEUROLOGY

## 2021-07-05 PROCEDURE — 97110 THERAPEUTIC EXERCISES: CPT

## 2021-07-05 PROCEDURE — 6370000000 HC RX 637 (ALT 250 FOR IP): Performed by: INTERNAL MEDICINE

## 2021-07-05 PROCEDURE — 81003 URINALYSIS AUTO W/O SCOPE: CPT

## 2021-07-05 RX ORDER — OLANZAPINE 7.5 MG/1
7.5 TABLET ORAL NIGHTLY
Status: DISCONTINUED | OUTPATIENT
Start: 2021-07-05 | End: 2021-07-06 | Stop reason: HOSPADM

## 2021-07-05 RX ADMIN — IBUPROFEN 400 MG: 400 TABLET, FILM COATED ORAL at 21:37

## 2021-07-05 RX ADMIN — ALLOPURINOL 100 MG: 100 TABLET ORAL at 09:03

## 2021-07-05 RX ADMIN — MULTIPLE VITAMINS W/ MINERALS TAB 1 TABLET: TAB at 21:37

## 2021-07-05 RX ADMIN — MULTIPLE VITAMINS W/ MINERALS TAB 1 TABLET: TAB at 09:03

## 2021-07-05 RX ADMIN — TRAZODONE HYDROCHLORIDE 50 MG: 50 TABLET ORAL at 21:37

## 2021-07-05 RX ADMIN — Medication 5000 UNITS: at 09:03

## 2021-07-05 RX ADMIN — OLANZAPINE 7.5 MG: 7.5 TABLET, FILM COATED ORAL at 21:37

## 2021-07-05 RX ADMIN — HYDROXYZINE HYDROCHLORIDE 50 MG: 50 TABLET, FILM COATED ORAL at 21:37

## 2021-07-05 ASSESSMENT — PAIN SCALES - GENERAL: PAINLEVEL_OUTOF10: 3

## 2021-07-05 NOTE — GROUP NOTE
Group Therapy Note    Date: 7/5/2021    Group Start Time: 1100  Group End Time: 5560  Group Topic: Psychoeducation    LUNA Campa    Patient declined to attend social skills group at 1100 despite encouragement from staff. 1:1 talk time offered by staff as alternative to group session.       Signature:  Renata Campa

## 2021-07-05 NOTE — PROGRESS NOTES
Physical Therapy  Facility/Department: [facility]  Daily Treatment Note  NAME: Eduar Jhaveri  : 1945  MRN: [id number]    Date of Service: 2021    Discharge Recommendations:  Patient would benefit from continued therapy after discharge        Assessment   Body structures, Functions, Activity limitations: Decreased functional mobility ; Decreased strength;Decreased safe awareness;Decreased endurance;Decreased cognition;Decreased balance;Decreased posture;Decreased coordination  Treatment Diagnosis: impaired mobility  Specific instructions for Next Treatment: Strengthening/balance ex's  Prognosis: Fair  Decision Making: Medium Complexity  Barriers to Learning: Cognitive  REQUIRES PT FOLLOW UP: Yes  Activity Tolerance  Activity Tolerance: Patient limited by endurance; Patient limited by fatigue;Patient limited by cognitive status     Patient Diagnosis(es): There were no encounter diagnoses. has a past medical history of Diabetes mellitus ([facility] [address] Hypertension, and Psychiatric problem. has no past surgical history on file. Restrictions  Restrictions/Precautions  Restrictions/Precautions: Fall Risk   Subjective   General  Additional Pertinent Hx: Eduar Jhaveri is a 77 y.o. female with significant past medical history of diabeties who presented to the ED via [address] [place] [address] for wondering behavior, knocking on doors and disorganized bizarre behavior. Per ED physician at [facility], patient was found wandering the neighborhood, accusing her  of hurting her, disorganized thought process and bizarre behavior. Subjective  Subjective: Patient in day area in w/c with her legs elevated on a chair in front of her. Pt [place]. Pt is cooperative with therapy. Pain Screening  Patient Currently in Pain: Denies  Vital Signs  Patient Currently in Pain: Denies     Orientation  Orientation  Overall Orientation Status: Impaired  Orientation Level: Oriented to person;Disoriented to place; Disoriented

## 2021-07-05 NOTE — GROUP NOTE
Group Therapy Note    Date: 7/5/2021    Group Start Time: 1000  Group End Time: 8351  Group Topic: Psychotherapy    LUNA BUSTOS    Lorayne Rape        Group Therapy Note           Patient refused to attend psychotherapy group after encouragement from staff. 1:1 talk time offered but refused. Signature:   Christopher Wing

## 2021-07-05 NOTE — PLAN OF CARE
Problem: Falls - Risk of:  Goal: Will remain free from falls  Description: Will remain free from falls  7/5/2021 0235 by Jayleen Viramontes LPN  Outcome: Ongoing     Problem: Altered Mood, Psychotic Behavior:  Goal: Able to verbalize decrease in frequency and intensity of hallucinations  Description: Able to verbalize decrease in frequency and intensity of hallucinations  7/5/2021 0235 by Jayleen Viramontes LPN  Outcome: Ongoing   Patient is free of falls at this time. Patient uses a wheelchair. Patient is wearing non skid footwear and seeks out staff for assistance as needed. Patient denies suicidal ideas at this time. Patient denies homicidal ideas at this time. Patient denies depressive symptoms at this time. Patient has been out in day room watching tv and socializing with peers. Patient is free of self harm at this time. Patient agrees to seek out staff if thoughts to harm self arise. Staff will provide support and reassurance as needed. Safety checks maintained every 15 minutes.

## 2021-07-05 NOTE — PROGRESS NOTES
Daily Progress Note  7/5/2021    Patient Name: Micheal Cole COMPLAINT: Acute psychosis         SUBJECTIVE:      Patient is seen today for a follow up assessment. She has been medication compliant. Patient remains in behavioral control and has not required any emergency medications. Patient was oriented to person and place only. Limited information was obtained during assessment today due to language barrier and patient being hard of hearing. Staff reported patient's language was not available on the hospital language communication device. She reported her depression was \"bad\" and endorsed anxiety. When asked about her appetite, patient reports \"not enough to eat\". Writer notified nursing staff. Patient reports her sleep was \"not too good\". Per [facility] Adult Daily Assessment flowsheet, staff documented 8 hours of sleep. She denies suicidal ideation, intent, or plans. She denies homicidal ideation, intent, or plans. She contracts for safety on the unit. She denies auditory hallucinations, visual hallucinations, paranoia, or delusions. She denies any medication side effects or medical concerns at the time of assessment. Staff reported patient was frequently requesting to go to the bathroom to urinate. UA was ordered and reviewed. Patient continues to be monitored in the inpatient psychiatric facility at [facility] for safety and stabilization. Appetite:  When asked about her appetite, patient reports \"not enough to eat\". Writer notified nursing staff. Sleep:  Patient reports her sleep was \"not too good\". Per [facility] Adult Daily Assessment flowsheet, staff documented 8 hours of sleep. Group Attendance on Unit:   [] Yes  [] Selectively    [x] No    Medication Side Effects: Patient denies any medication side effects at the time of assessment.          Mental Status Exam  Level of consciousness: Alert and awake   Appearance: Appropriate attire for setting, seated in chair, with good  grooming and hygiene   Behavior/Motor: Approachable, no psychomotor abnormalities   Attitude toward examiner: Cooperative, attentive, good eye contact  Speech: Normal rate, normal volume, normal tone. Mood:  Patient reports \"good\". Affect: Anxious  Thought processes: Linear today. Thought content: Denies homicidal ideation  Suicidal Ideation: Denies suicidal ideations, without current plan or intent, contracts for safety on the unit. Delusions: No evidence of delusions. Denies paranoia. Perceptual Disturbance: Patient does not appear to be responding to internal stimuli. Denies auditory or visual hallucinations. Cognition: Oriented to person and place only. Memory: impaired   Insight & Judgement: Poor     Data   oral temperature is 98.3 °F (36.8 °C). Her blood pressure is 108/55 (abnormal) and her pulse is 68. Her respiration is 14 and oxygen saturation is 100%. Labs:   No results displayed because visit has over 200 results. Reviewed patient's current plan of care and vital signs with nursing staff. Labs reviewed: [x] Yes  No EKG in EMR. Medications  Current Facility-Administered Medications: OLANZapine (ZYPREXA) tablet 7.5 mg, 7.5 mg, Oral, Nightly  therapeutic multivitamin-minerals 1 tablet, 1 tablet, Oral, BID  acetaminophen (TYLENOL) tablet 650 mg, 650 mg, Oral, Q4H PRN  ibuprofen (ADVIL;MOTRIN) tablet 400 mg, 400 mg, Oral, Q6H PRN  polyethylene glycol (GLYCOLAX) packet 17 g, 17 g, Oral, Daily PRN  aluminum & magnesium hydroxide-simethicone (MAALOX) 200-200-20 MG/5ML suspension 30 mL, 30 mL, Oral, Q6H PRN  hydrOXYzine (ATARAX) tablet 50 mg, 50 mg, Oral, TID PRN  traZODone (DESYREL) tablet 50 mg, 50 mg, Oral, Nightly PRN  allopurinol (ZYLOPRIM) tablet 100 mg, 100 mg, Oral, Daily  vitamin D3 (CHOLECALCIFEROL) tablet 5,000 Units, 1 tablet, Oral, Daily    ASSESSMENT  Acute psychosis ([facility] [address]         PLAN  Patient symptoms are: Modestly Improving  Continue current medication regimen.    Medication discontinued: Ophthalmic Ciprofloxacin was discontinued after discussion with Dr. Paige Castañeda. Internal medicine consult was previously ordered. New order: Urinalysis reflex to culture was ordered and reviewed. Monitor need and frequency of PRN medications. Encourage participation in groups and milieu. Attempt to develop insight. Psycho-education conducted. Supportive Therapy conducted. Probable discharge is to be determined by MD.   Follow-up daily while inpatient. Patient continues to be monitored in the inpatient psychiatric facility at [facility] for safety and stabilization. Patient continues to need, on a daily basis, active treatment furnished directly by or requiring the supervision of inpatient psychiatric personnel. Electronically signed by PEREZ Wallis CNP on 7/5/2021 at 5:52 PM    **This report has been created using voice recognition software. It may contain minor errors which are inherent in voice recognition technology. **    I independently saw and evaluated the patient. I reviewed the midlevel provider's documentation above. Any additional comments or changes to the midlevel provider's documentation are stated below otherwise agree with assessment.       Patient reports an improvement in her mood. She has been eating well. She has some difficulty in sleeping at night. Her hearing seems to be improved.         PLAN  Zyprexa increased to 7-1/2 mg at nighttime  Attempt to develop insight  Psycho-education conducted. Supportive Therapy conducted.   Probable discharge is 1 day  Follow-up daily while on inpatient unit     Electronically signed by Porsche Kwok MD on 7/5/21 at 4:01 PM EDT

## 2021-07-05 NOTE — PROGRESS NOTES
[facility]    Occupational Therapy Evaluation  Date: 21  Patient Name: Gagandeep Horta       Room: 4251/9829-83  MRN: [id number]  Account: [de-identified]   : 1945  (72 y.o.) Gender: female     Discharge Recommendations:  Further Occupational Therapy is recommended upon facility discharge. OT Equipment Recommendations  Equipment Needed: Yes  Other: depend on d/c environment will need tub, toilet equipment, walker       Diagnosis: acute psychosis, celiac -gluten free diet, anemia  Additional Pertinent Hx: at pt home: lack of assess to basic needs (water and electricity) and overall safety as team has been updated that the home has been condemned. guardianship is recommended. Gagandeep Horta is a 77 y.o. female with significant past medical history of diabeties who presented to the ED via [address] [place] [address] for wondering behavior, knocking on doors and disorganized bizarre behavior. Per ED physician at [facility], patient was found wandering the neighborhood, accusing her  of hurting her, disorganized thought process and bizarre behavior. Past Medical History:  has a past medical history of Diabetes mellitus ([address] Hypertension, and Psychiatric problem. Past Surgical History:   has no past surgical history on file. Restrictions  Restrictions/Precautions: Fall Risk     Vitals  Temp: 98.3 °F (36.8 °C)  Pulse: 68  Resp: 14  BP: (!) 108/55  Height:  (unknown by patient)  Weight:  (SAMIRA)  Oxygen Therapy  SpO2: 100 %  O2 Device: None (Room air)  Level of Consciousness: Alert (0)    Subjective  Subjective: \"I cannot read [place]. \"  pt very [place], per pt hears best in L ear but could not hear when OT close to L ear and spoke loudly,  pt unable to read english to assist communication, followed gestures. pt notes has hearing aide at home.   Overall Orientation Status:  (unable to determine due to poor hearing)  Vision  Vision: Impaired  Vision Exceptions:  (vision decreased R eye.)  Hearing  Hearing: Exceptions to [facility]  Hearing Exceptions: Hard of hearing/hearing concerns (pt very [place], per pt hears best in L ear but could not hear when OT close to L ear and spoke loudly,  pt unable to read english to assist communication, followed gestures. pt notes has hearing aide at home.)  Social/Functional History  Lives With: Spouse  Home Equipment: Rolling walker  Ambulation Assistance: Independent (Per pt she does not use a rolling walker)  Transfer Assistance: Independent  IADL Comments: lack of assess to basic needs (water and electricity) and overall safety as team has been updated that the home has been condemned. guardianship is recommended. Additional Comments: Pt very [place], disoriented, unable to provide much information regarding prior level of fucntion and living situation. Did report she was able to get up and walk on her own. per chart APS is involved, pt and spouse with bedbugs on person, [facility] with [facility], SW is checking re this potential idea with son who is POA. Objective      Cognition  Cognition Comment: seems fair but unable to assess fully due to poor hearing, unable to read [place] writing. RN reports pt appropriately self inititates toileting and asks for assist due to balance deficits- demo good safety. ADL  Feeding: Independent (pt at table eating cereal she requested after finishing other breakfast foods.)  Grooming: Setup  UE Bathing: Setup  LE Bathing: Setup, Minimal assistance  UE Dressing: Setup  LE Dressing: Setup, Minimal assistance  Toileting: Minimal assistance  Additional Comments: pt demo indep don B socks, is able to cross BLE, remainder deducted from mobility assessment, RN reports pt appropriately self inititates toileting and asks for assist due to balance deficits- demo good safety. UE Function           LUE Strength  LUE Strength Comment: unable to test due to communication deficits.            LUE AROM (degrees)  LUE AROM : WFL        RUE Strength  RUE Strength Comment: unable to test due to communication deficits. RUE AROM (degrees)  RUE AROM : WFL          Fine Motor Skills  Coordination  Movements Are Fluid And Coordinated: Yes                           Mobility          Balance  Sitting Balance: Independent  Standing Balance: Minimal assistance (min- CGA)  Standing Balance  Time: 6 min  Activity: ambulate with walker 60 feet  Functional Mobility  Functional - Mobility Device: Rolling Walker  Assist Level: Minimal assistance (min- CGA)  Functional Mobility Comments: ambulate with walker 60 feet, pt made 2 small [place] laps around dining room. W/c follow was provided.         Transfers  Stand Step Transfers: Minimal assistance  Sit to stand: Minimal assistance  Stand to sit: Minimal assistance  Transfer Comments: min- CGA  Toilet Transfers  Toilet - Technique: Stand pivot  Equipment Used: Grab bars  Toilet Transfer: Minimal assistance (min- CGA)  Toilet Transfers Comments: per RN report      Assessment  Performance deficits / Impairments: Decreased functional mobility , Decreased ADL status, Decreased endurance, Decreased balance  Prognosis: Good  Decision Making: Low Complexity  History: acute psychosis, celiac -gluten free diet, anemia  Exam: 4 performance deficits  Assistance / Modification: low  REQUIRES OT FOLLOW UP: Yes  Activity Tolerance: Patient Tolerated treatment well                   Goals  Patient Goals   Patient goals : pt unable to formulate but very cooperative  Short term goals  Time Frame for Short term goals: 1 week  Short term goal 1: SBA adl transfers  Short term goal 2: set up and SBA LE self care  Short term goal 3: ambulate to obtain set up for adls with walker and SBA  Short term goal 4: mali 10-12 min stand, ambulate with walker and SBA for adls, ex    Plan  Safety Devices  Safety Devices in place: Yes  Type of devices:  (pt was left in W/c in nsg line of sight as she was found, walker was returned to nsg station.)     Plan  Times per week: 3-4  Times per day: Daily  Current Treatment Recommendations: Safety Education & Training, Balance Training, Patient/Caregiver Education & Training, Self-Care / ADL, Functional Mobility Training, Equipment Evaluation, Education, & procurement, Endurance Training  OT Education  OT Education: Plan of Care  Patient Education: OT ed nsg to provide daily ambulation as tolerated with 1 assist, walker and w/c follow.     OT Equipment Recommendations  Equipment Needed: Yes  Other: depend on d/c environment will need tub, toilet equipment, walker  OT Individual Minutes  Time In: 9628 (chart review 9:02 to 9:27)  Time Out: 0950  Minutes: 22    Electronically signed by Dat Winslow OT on 7/5/21 at 2:35 PM EDT

## 2021-07-06 LAB
ABSOLUTE EOS #: 0.3 K/UL (ref 0–0.4)
ABSOLUTE IMMATURE GRANULOCYTE: ABNORMAL K/UL (ref 0–0.3)
ABSOLUTE LYMPH #: 2 K/UL (ref 1–4.8)
ABSOLUTE MONO #: 0.4 K/UL (ref 0.1–1.3)
BASOPHILS # BLD: 1 % (ref 0–2)
BASOPHILS ABSOLUTE: 0 K/UL (ref 0–0.2)
DIFFERENTIAL TYPE: ABNORMAL
EOSINOPHILS RELATIVE PERCENT: 7 % (ref 0–4)
HCT VFR BLD CALC: 26.8 % (ref 36–46)
HEMOGLOBIN: 8.7 G/DL (ref 12–16)
IMMATURE GRANULOCYTES: ABNORMAL %
LYMPHOCYTES # BLD: 42 % (ref 24–44)
MCH RBC QN AUTO: 30.7 PG (ref 26–34)
MCHC RBC AUTO-ENTMCNC: 32.5 G/DL (ref 31–37)
MCV RBC AUTO: 94.4 FL (ref 80–100)
MONOCYTES # BLD: 9 % (ref 1–7)
NRBC AUTOMATED: ABNORMAL PER 100 WBC
PDW BLD-RTO: 14.1 % (ref 11.5–14.9)
PLATELET # BLD: 344 K/UL (ref 150–450)
PLATELET ESTIMATE: ABNORMAL
PMV BLD AUTO: 6.5 FL (ref 6–12)
RBC # BLD: 2.84 M/UL (ref 4–5.2)
RBC # BLD: ABNORMAL 10*6/UL
SEG NEUTROPHILS: 41 % (ref 36–66)
SEGMENTED NEUTROPHILS ABSOLUTE COUNT: 2 K/UL (ref 1.3–9.1)
WBC # BLD: 4.9 K/UL (ref 3.5–11)
WBC # BLD: ABNORMAL 10*3/UL

## 2021-07-06 PROCEDURE — 99238 HOSP IP/OBS DSCHRG MGMT 30/<: CPT | Performed by: PSYCHIATRY & NEUROLOGY

## 2021-07-06 PROCEDURE — 6370000000 HC RX 637 (ALT 250 FOR IP): Performed by: INTERNAL MEDICINE

## 2021-07-06 PROCEDURE — 6370000000 HC RX 637 (ALT 250 FOR IP): Performed by: PSYCHIATRY & NEUROLOGY

## 2021-07-06 PROCEDURE — 36415 COLL VENOUS BLD VENIPUNCTURE: CPT

## 2021-07-06 PROCEDURE — 85025 COMPLETE CBC W/AUTO DIFF WBC: CPT

## 2021-07-06 RX ORDER — OLANZAPINE 7.5 MG/1
7.5 TABLET ORAL NIGHTLY
Qty: 30 TABLET | Refills: 3 | Status: SHIPPED | OUTPATIENT
Start: 2021-07-06

## 2021-07-06 RX ORDER — ALLOPURINOL 100 MG/1
100 TABLET ORAL DAILY
Qty: 30 TABLET | Refills: 3 | Status: SHIPPED | OUTPATIENT
Start: 2021-07-06

## 2021-07-06 RX ORDER — M-VIT,TX,IRON,MINS/CALC/FOLIC 27MG-0.4MG
1 TABLET ORAL 2 TIMES DAILY
Qty: 30 TABLET | Refills: 0 | Status: SHIPPED | OUTPATIENT
Start: 2021-07-06

## 2021-07-06 RX ADMIN — Medication 5000 UNITS: at 09:57

## 2021-07-06 RX ADMIN — ALLOPURINOL 100 MG: 100 TABLET ORAL at 09:57

## 2021-07-06 RX ADMIN — MULTIPLE VITAMINS W/ MINERALS TAB 1 TABLET: TAB at 09:57

## 2021-07-06 NOTE — GROUP NOTE
Group Therapy Note    Date: 7/6/2021    Group Start Time: 1330  Group End Time: 6258  Group Topic: Cognitive Skills    LUNA Jacobson Courser, RACHAELS        Group Therapy Note    Attendees: 9/16         Patient's Goal:  To increase social interaction and to practice expressing feelings, identifying positive coping skills and resources r/t moving forward, and communication skills        Notes: Pt was assisted to group and brightened on approach; however, pt chose not to participate in task/discussion but socialized briefly with RT et peers when approached. After 20 mins pt asked to go back to her room and lie down, pt was encouraged to continue talking with group but declined. Pt was assisted back to her room , and to lie down in bed by RN.

## 2021-07-06 NOTE — GROUP NOTE
Group Therapy Note    Date: 7/6/2021    Group Start Time: 1100  Group End Time: 1130  Group Topic: Psychoeducation    APOLINAR Toure    Patient refused to attend Recreational Therapy group at 1100 after encouragement from staff.   1:1 talk time provided as alternative to group session       Signature:  Clarita Rizvi
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
## 2021-07-06 NOTE — BH NOTE
[facility]  Discharge Note    Pt discharged with followings belongings:   Dentures: None  Vision - Corrective Lenses: Glasses  Hearing Aid: None  Jewelry: None  Body Piercings Removed: N/A  Clothing: Other (Comment) (bed bugs,bagged)  Were All Patient Medications Collected?: Other (comment) (bed bugs)  Other Valuables: Other (Comment) (bed bugs)   Valuables sent home with  on 6/24 or returned to patient. Patient education on aftercare instructions: daughter and patient   Information faxed to [facility] by nurse  at 4:47 PM .Patient verbalize understanding of AVS:  Yes. Discharged to private residence.      Status EXAM upon discharge:  Status and Exam  Normal: Yes  Facial Expression: Brightened  Affect: Appropriate  Level of Consciousness: Alert  Mood:Normal: No  Mood: Anxious  Motor Activity:Normal: No  Motor Activity: Unusual Posture/Gait (W/C)  Interview Behavior: Cooperative  Preception: [place] to Person, Layvonne Deidra to Time, [place] to Place, [place] to Situation  Attention:Normal: No  Attention: Distractible  Thought Processes: Circumstantial  Thought Content:Normal: Yes  Thought Content: Preoccupations  Hallucinations: None  Delusions: No  Delusions:  (SAMIRA, pt sleeping all of shift)  Memory:Normal: No  Memory: Poor Recent  Insight and Judgment: No  Insight and Judgment: Poor Judgment, Poor Insight  Present Suicidal Ideation: No  Present Homicidal Ideation: No      Metabolic Screening:    Lab Results   Component Value Date    LABA1C 5.6 06/18/2021       No results found for: CHOL  No results found for: TRIG  No results found for: HDL  No components found for: LDLCAL  No results found for: Sumaya Jett LPN

## 2021-07-06 NOTE — GROUP NOTE
Group Therapy Note    Date: 7/6/2021    Group Start Time: 1000  Group End Time: 7972  Group Topic: Group Therapy    [facility]    NADEEM Mak, KENNEDY        Group Therapy Note    Attendees: 13/21         Pt declined to attend psychotherapy at 1000 am despite encouragement. Pt offered 1:1 and refused.    Signature:  NADEEM Mak, KENNEDY

## 2021-07-06 NOTE — PLAN OF CARE
Problem: Altered Mood, Deterioration in Function:  Goal: Able to verbalize reality based thinking  Description: Able to verbalize reality based thinking  Outcome: Ongoing     Problem: Altered Mood, Psychotic Behavior:  Goal: Able to verbalize decrease in frequency and intensity of hallucinations  Description: Able to verbalize decrease in frequency and intensity of hallucinations  Outcome: Ongoing     Problem: Falls - Risk of:  Goal: Will remain free from falls  Description: Will remain free from falls  Outcome: Ongoing   Patient remains cooperative and in control, remains accepting of staff assistance. Patient is able to verbalize needs appropriately. No evidence of hallucinations. She is able to understand and accept medication from staff, remains focused on food. Patient is focused on discharge scheduled for tomorrow, asking for under ware to take home.

## 2021-07-07 NOTE — CARE COORDINATION
Name: Alba Poster    : 1945    Discharge Date: 2021    Primary Auth/Cert #: [id number]    Destination: home    Discharge Medications:      Medication List      START taking these medications    therapeutic multivitamin-minerals tablet  Take 1 tablet by mouth 2 times daily  Notes to patient: Vitamin supplement        CHANGE how you take these medications    OLANZapine 7.5 MG tablet  Commonly known as: ZYPREXA  Take 1 tablet by mouth nightly  What changed:   · medication strength  · how much to take  Notes to patient: Mood/clears thoughts        CONTINUE taking these medications    allopurinol 100 MG tablet  Commonly known as: ZYLOPRIM  Take 1 tablet by mouth daily  Notes to patient: Used to cut uric acid in the blood     Vitamin D3 1.25 MG (11988 UT) Caps  Notes to patient: supplement        STOP taking these medications    amLODIPine 5 MG tablet  Commonly known as: NORVASC     divalproex 500 MG extended release tablet  Commonly known as: DEPAKOTE ER     metFORMIN 500 MG tablet  Commonly known as: GLUCOPHAGE     PARoxetine 20 MG tablet  Commonly known as: PAXIL           Where to Get Your Medications      These medications were sent to [place] [address]    Phone: [phone]   · allopurinol 100 MG tablet  · OLANZapine 7.5 MG tablet  · therapeutic multivitamin-minerals tablet         Follow Up Appointment: Discharge to daughter's home:  [address] [place] [address] & [facility]    Go on 2021  Lexie Plasencia at [phone] will be picking Ms. Quita Heath up today around 4:00pm    [place] [address] [address] [facility], [address] [place] [address]  [phone] Fax    Contact: Bill Doan at [phone]      Call on 2021  Lexie Plasencia, they will call you tomorrow at [phone] to set-up a time to come to your home and meet with you and your mom.      [facility]  [address] [facility] [address] [facility]  Phone: [phone]  Fax: [phone]  Please fax AVS     called for an appointment for a new PCP and they have no availability until Sept. 2021    [address]  Phone: [phone]  Fax: [phone]  Crisis Hotline: [phone]    Go on 7/13/2021  Pb Henning, you have a hospital discharge appointment on Tuesday, July 13 at 2:30pn for a new client intake and assessment.

## 2023-02-23 ENCOUNTER — HOSPITAL ENCOUNTER (OUTPATIENT)
Age: 78
Setting detail: SPECIMEN
Discharge: HOME | End: 2023-02-23
Payer: MEDICARE

## 2023-02-23 LAB
ANION GAP SERPL CALCULATED.3IONS-SCNC: 18 MEQ/L (ref 9–15)
BUN BLDV-MCNC: 45 MG/DL (ref 8–23)
CALCIUM SERPL-MCNC: 9.5 MG/DL (ref 8.5–9.9)
CHLORIDE BLD-SCNC: 102 MEQ/L (ref 95–107)
CO2: 17 MEQ/L (ref 20–31)
CREAT SERPL-MCNC: 1.74 MG/DL (ref 0.5–0.9)
GFR SERPL CREATININE-BSD FRML MDRD: 29.9 ML/MIN/{1.73_M2}
GLUCOSE BLD-MCNC: 100 MG/DL (ref 70–99)
POTASSIUM SERPL-SCNC: 5.3 MEQ/L (ref 3.4–4.9)
SODIUM BLD-SCNC: 137 MEQ/L (ref 135–144)

## 2023-02-23 PROCEDURE — 80048 BASIC METABOLIC PNL TOTAL CA: CPT

## 2024-03-26 NOTE — BH NOTE
LOS NOTE Patient is sitting in dayroom in Wheelchair with peers [FreeTextEntry3] : This note was written by Arpan Glover on 03/26/2024 acting solely as a scribe for Dr. Venu Garcia.   All medical record entries made by the Scribe were at my, Dr. Venu Garcia, direction and personally dictated by me on 03/26/2024. I have personally reviewed the chart and agree that the record accurately reflects my personal performance of the history, physical exam, assessment and plan.

## 2025-04-29 ENCOUNTER — HOSPITAL ENCOUNTER (OUTPATIENT)
Age: 80
Setting detail: SPECIMEN
Discharge: HOME | End: 2025-04-29

## 2025-04-29 LAB
CHOLEST SERPL-MCNC: 162 MG/DL (ref 0–199)
CHOLESTEROL/HDL RATIO: 2.7
HDLC SERPL-MCNC: 60 MG/DL
LDLC SERPL CALC-MCNC: 79 MG/DL (ref 0–100)
TRIGL SERPL-MCNC: 114 MG/DL
VLDLC SERPL CALC-MCNC: 23 MG/DL (ref 1–30)

## 2025-04-29 PROCEDURE — P9604 ONE-WAY ALLOW PRORATED TRIP: HCPCS

## 2025-04-29 PROCEDURE — 80061 LIPID PANEL: CPT

## 2025-04-29 PROCEDURE — 36415 COLL VENOUS BLD VENIPUNCTURE: CPT
